# Patient Record
Sex: MALE | Race: WHITE | Employment: FULL TIME | URBAN - METROPOLITAN AREA
[De-identification: names, ages, dates, MRNs, and addresses within clinical notes are randomized per-mention and may not be internally consistent; named-entity substitution may affect disease eponyms.]

---

## 2017-01-25 ENCOUNTER — TELEPHONE (OUTPATIENT)
Dept: FAMILY MEDICINE CLINIC | Age: 42
End: 2017-01-25

## 2017-01-25 ENCOUNTER — OFFICE VISIT (OUTPATIENT)
Dept: FAMILY MEDICINE CLINIC | Age: 42
End: 2017-01-25

## 2017-01-25 VITALS
SYSTOLIC BLOOD PRESSURE: 118 MMHG | TEMPERATURE: 97.8 F | DIASTOLIC BLOOD PRESSURE: 76 MMHG | HEIGHT: 77 IN | HEART RATE: 72 BPM | WEIGHT: 315 LBS | RESPIRATION RATE: 16 BRPM | BODY MASS INDEX: 37.19 KG/M2

## 2017-01-25 DIAGNOSIS — J32.0 CHRONIC MAXILLARY SINUSITIS: Primary | ICD-10-CM

## 2017-01-25 DIAGNOSIS — B34.9 VIRAL SYNDROME: ICD-10-CM

## 2017-01-25 DIAGNOSIS — R51.9 SINUS HEADACHE: ICD-10-CM

## 2017-01-25 PROCEDURE — 99213 OFFICE O/P EST LOW 20 MIN: CPT | Performed by: NURSE PRACTITIONER

## 2017-01-25 RX ORDER — KETOROLAC TROMETHAMINE 10 MG/1
10 TABLET, FILM COATED ORAL EVERY 6 HOURS PRN
Qty: 20 TABLET | Refills: 0 | Status: SHIPPED | OUTPATIENT
Start: 2017-01-25 | End: 2019-01-14 | Stop reason: ALTCHOICE

## 2017-01-25 ASSESSMENT — ENCOUNTER SYMPTOMS
COUGH: 0
SINUS PRESSURE: 1
NAUSEA: 0
ABDOMINAL PAIN: 0
RHINORRHEA: 1
SHORTNESS OF BREATH: 0

## 2018-01-30 ENCOUNTER — OFFICE VISIT (OUTPATIENT)
Dept: FAMILY MEDICINE CLINIC | Age: 43
End: 2018-01-30
Payer: COMMERCIAL

## 2018-01-30 VITALS
RESPIRATION RATE: 20 BRPM | SYSTOLIC BLOOD PRESSURE: 134 MMHG | BODY MASS INDEX: 37.19 KG/M2 | TEMPERATURE: 97.7 F | OXYGEN SATURATION: 95 % | HEART RATE: 88 BPM | DIASTOLIC BLOOD PRESSURE: 82 MMHG | WEIGHT: 315 LBS | HEIGHT: 77 IN

## 2018-01-30 DIAGNOSIS — Z20.828 EXPOSURE TO INFLUENZA: ICD-10-CM

## 2018-01-30 DIAGNOSIS — E66.01 OBESITY, CLASS III, BMI 40-49.9 (MORBID OBESITY) (HCC): ICD-10-CM

## 2018-01-30 DIAGNOSIS — J10.1 INFLUENZA B: Primary | ICD-10-CM

## 2018-01-30 LAB
INFLUENZA A ANTIBODY: NORMAL
INFLUENZA B ANTIBODY: NORMAL

## 2018-01-30 PROCEDURE — G8427 DOCREV CUR MEDS BY ELIG CLIN: HCPCS | Performed by: NURSE PRACTITIONER

## 2018-01-30 PROCEDURE — 99213 OFFICE O/P EST LOW 20 MIN: CPT | Performed by: NURSE PRACTITIONER

## 2018-01-30 PROCEDURE — 87804 INFLUENZA ASSAY W/OPTIC: CPT | Performed by: NURSE PRACTITIONER

## 2018-01-30 PROCEDURE — G8417 CALC BMI ABV UP PARAM F/U: HCPCS | Performed by: NURSE PRACTITIONER

## 2018-01-30 PROCEDURE — 1036F TOBACCO NON-USER: CPT | Performed by: NURSE PRACTITIONER

## 2018-01-30 PROCEDURE — G8482 FLU IMMUNIZE ORDER/ADMIN: HCPCS | Performed by: NURSE PRACTITIONER

## 2018-01-30 RX ORDER — PSEUDOEPHEDRINE HCL 120 MG/1
120 TABLET, FILM COATED, EXTENDED RELEASE ORAL EVERY 12 HOURS
Qty: 14 TABLET | Refills: 0 | Status: SHIPPED | OUTPATIENT
Start: 2018-01-30 | End: 2018-02-06

## 2018-01-30 RX ORDER — OSELTAMIVIR PHOSPHATE 75 MG/1
75 CAPSULE ORAL 2 TIMES DAILY
Qty: 10 CAPSULE | Refills: 0 | Status: SHIPPED | OUTPATIENT
Start: 2018-01-30 | End: 2018-02-04

## 2018-01-30 ASSESSMENT — ENCOUNTER SYMPTOMS
COUGH: 0
SORE THROAT: 1
SINUS PAIN: 1
ABDOMINAL PAIN: 0
NAUSEA: 0
RHINORRHEA: 1
SHORTNESS OF BREATH: 0
SINUS PRESSURE: 1

## 2018-01-30 ASSESSMENT — PATIENT HEALTH QUESTIONNAIRE - PHQ9
1. LITTLE INTEREST OR PLEASURE IN DOING THINGS: 0
2. FEELING DOWN, DEPRESSED OR HOPELESS: 0
SUM OF ALL RESPONSES TO PHQ QUESTIONS 1-9: 0
SUM OF ALL RESPONSES TO PHQ9 QUESTIONS 1 & 2: 0

## 2018-01-30 NOTE — PROGRESS NOTES
Subjective:      Patient ID: Tessie Pinon is a 38 y.o. male. HPI: Acute for URI    Chief Complaint   Patient presents with    Headache     x 2 days    Nasal Congestion    Fatigue    Generalized Body Aches       Onset of 2 days with above symptoms. No fever. Mild cough. Denies CP, SOB or chest tightness. Head congestion is biggest complaint. Sleep difficult due to congestion. Weakness and achiness. Wife was POS for FLU B and was admitted into hosptial    Vitals:    01/30/18 0918   BP: 134/82   Pulse: 88   Resp: 20   Temp: 97.7 °F (36.5 °C)   SpO2: 95%         Review of Systems   Constitutional: Positive for fatigue. Negative for chills and fever. HENT: Positive for congestion, rhinorrhea, sinus pain, sinus pressure and sore throat. Respiratory: Negative for cough and shortness of breath. Cardiovascular: Negative for chest pain. Gastrointestinal: Negative for abdominal pain and nausea. Musculoskeletal: Positive for myalgias. Skin: Negative for rash. Neurological: Positive for headaches. Negative for dizziness and light-headedness. Psychiatric/Behavioral: Negative. Objective:   Physical Exam   Constitutional: He is oriented to person, place, and time. Vital signs are normal. He appears well-developed and well-nourished. He is active. He does not have a sickly appearance. No distress. HENT:   Right Ear: Tympanic membrane normal.   Left Ear: Tympanic membrane normal.   Nose: Mucosal edema and rhinorrhea present. Right sinus exhibits maxillary sinus tenderness. Left sinus exhibits maxillary sinus tenderness. Mouth/Throat: Mucous membranes are normal. Posterior oropharyngeal edema present. No posterior oropharyngeal erythema. Cardiovascular: Normal rate, regular rhythm, S1 normal, S2 normal, normal heart sounds and normal pulses. Exam reveals no S3. No murmur heard. Pulmonary/Chest: Effort normal and breath sounds normal. He has no decreased breath sounds.  He has

## 2018-02-01 ENCOUNTER — TELEPHONE (OUTPATIENT)
Dept: FAMILY MEDICINE CLINIC | Age: 43
End: 2018-02-01

## 2018-02-01 NOTE — TELEPHONE ENCOUNTER
Pt called office stating he was given a work slip to [employer] today. He said his symptoms are still present and he is unable to perform his job. Pt is asking for a work slip to cover him today. He would like it faxed to [employer] attn: Rojelio Saucedo at [phone]. Pt wants a call back when this is taken care of. Please advise.

## 2019-01-14 ENCOUNTER — OFFICE VISIT (OUTPATIENT)
Dept: FAMILY MEDICINE CLINIC | Age: 44
End: 2019-01-14
Payer: COMMERCIAL

## 2019-01-14 VITALS
SYSTOLIC BLOOD PRESSURE: 136 MMHG | WEIGHT: 315 LBS | TEMPERATURE: 98.4 F | HEART RATE: 60 BPM | HEIGHT: 77 IN | BODY MASS INDEX: 37.19 KG/M2 | DIASTOLIC BLOOD PRESSURE: 84 MMHG | RESPIRATION RATE: 16 BRPM

## 2019-01-14 DIAGNOSIS — R68.89 FLU-LIKE SYMPTOMS: Primary | ICD-10-CM

## 2019-01-14 DIAGNOSIS — E66.01 OBESITY, CLASS III, BMI 40-49.9 (MORBID OBESITY) (HCC): ICD-10-CM

## 2019-01-14 PROCEDURE — 1036F TOBACCO NON-USER: CPT | Performed by: NURSE PRACTITIONER

## 2019-01-14 PROCEDURE — 87804 INFLUENZA ASSAY W/OPTIC: CPT | Performed by: NURSE PRACTITIONER

## 2019-01-14 PROCEDURE — G8427 DOCREV CUR MEDS BY ELIG CLIN: HCPCS | Performed by: NURSE PRACTITIONER

## 2019-01-14 PROCEDURE — G8417 CALC BMI ABV UP PARAM F/U: HCPCS | Performed by: NURSE PRACTITIONER

## 2019-01-14 PROCEDURE — 99213 OFFICE O/P EST LOW 20 MIN: CPT | Performed by: NURSE PRACTITIONER

## 2019-01-14 PROCEDURE — G8484 FLU IMMUNIZE NO ADMIN: HCPCS | Performed by: NURSE PRACTITIONER

## 2019-01-14 RX ORDER — PSEUDOEPHEDRINE HCL 120 MG/1
120 TABLET, FILM COATED, EXTENDED RELEASE ORAL EVERY 12 HOURS
Qty: 14 TABLET | Refills: 0 | Status: SHIPPED | OUTPATIENT
Start: 2019-01-14 | End: 2019-01-21

## 2019-01-14 ASSESSMENT — ENCOUNTER SYMPTOMS
SHORTNESS OF BREATH: 0
COUGH: 0
SINUS PRESSURE: 1
RHINORRHEA: 1
ABDOMINAL PAIN: 0
SINUS PAIN: 1
NAUSEA: 0

## 2019-01-15 LAB
INFLUENZA A ANTIBODY: NORMAL
INFLUENZA B ANTIBODY: NORMAL

## 2019-04-17 ENCOUNTER — TELEPHONE (OUTPATIENT)
Dept: FAMILY MEDICINE CLINIC | Age: 44
End: 2019-04-17

## 2019-04-17 ENCOUNTER — OFFICE VISIT (OUTPATIENT)
Dept: FAMILY MEDICINE CLINIC | Age: 44
End: 2019-04-17
Payer: COMMERCIAL

## 2019-04-17 VITALS
RESPIRATION RATE: 20 BRPM | BODY MASS INDEX: 37.19 KG/M2 | SYSTOLIC BLOOD PRESSURE: 136 MMHG | HEIGHT: 77 IN | DIASTOLIC BLOOD PRESSURE: 78 MMHG | HEART RATE: 76 BPM | WEIGHT: 315 LBS | TEMPERATURE: 98.1 F

## 2019-04-17 DIAGNOSIS — B35.1 ONYCHOMYCOSIS: Primary | ICD-10-CM

## 2019-04-17 DIAGNOSIS — E66.01 MORBID OBESITY WITH BMI OF 50.0-59.9, ADULT (HCC): ICD-10-CM

## 2019-04-17 DIAGNOSIS — B37.49 GENITAL CANDIDIASIS IN MALE: ICD-10-CM

## 2019-04-17 PROCEDURE — G8427 DOCREV CUR MEDS BY ELIG CLIN: HCPCS | Performed by: NURSE PRACTITIONER

## 2019-04-17 PROCEDURE — 99213 OFFICE O/P EST LOW 20 MIN: CPT | Performed by: NURSE PRACTITIONER

## 2019-04-17 PROCEDURE — 1036F TOBACCO NON-USER: CPT | Performed by: NURSE PRACTITIONER

## 2019-04-17 PROCEDURE — G8417 CALC BMI ABV UP PARAM F/U: HCPCS | Performed by: NURSE PRACTITIONER

## 2019-04-17 RX ORDER — ACETAMINOPHEN 500 MG
500 TABLET ORAL EVERY 6 HOURS PRN
COMMUNITY
End: 2021-10-26

## 2019-04-17 RX ORDER — NYSTATIN 100000 [USP'U]/G
POWDER TOPICAL 2 TIMES DAILY
Qty: 1 BOTTLE | Refills: 3 | Status: SHIPPED | OUTPATIENT
Start: 2019-04-17 | End: 2020-03-06

## 2019-04-17 RX ORDER — TERBINAFINE HYDROCHLORIDE 250 MG/1
250 TABLET ORAL DAILY
Qty: 90 TABLET | Refills: 0 | Status: SHIPPED | OUTPATIENT
Start: 2019-04-17 | End: 2019-07-16

## 2019-04-17 RX ORDER — CLOTRIMAZOLE 1 %
CREAM (GRAM) TOPICAL
Qty: 120 G | Refills: 0 | Status: SHIPPED | OUTPATIENT
Start: 2019-04-17 | End: 2019-04-24

## 2019-04-17 ASSESSMENT — PATIENT HEALTH QUESTIONNAIRE - PHQ9
SUM OF ALL RESPONSES TO PHQ9 QUESTIONS 1 & 2: 0
SUM OF ALL RESPONSES TO PHQ QUESTIONS 1-9: 0
SUM OF ALL RESPONSES TO PHQ QUESTIONS 1-9: 0
2. FEELING DOWN, DEPRESSED OR HOPELESS: 0
1. LITTLE INTEREST OR PLEASURE IN DOING THINGS: 0

## 2019-04-17 ASSESSMENT — ENCOUNTER SYMPTOMS: ROS SKIN COMMENTS: SEE HPI.

## 2019-04-17 NOTE — TELEPHONE ENCOUNTER
Pt called office stating he had an appt with you today and rx Nystatin powder was prescribed. He was told by [facility] that his insurance will not cover this. Please advise.

## 2019-04-17 NOTE — PROGRESS NOTES
Subjective:      Patient ID: Luis Alberto Swan is a 41 y.o. male. HPI: Acute for male issues    Chief Complaint   Patient presents with   Bret Collier Other     discuss male issues, along with some fungus on his toe nails     Patient comes in with a rash and sore areas that \"feel like they are cut\" for the last year intermittently. He is morbidly obese and has multiple inguinal and groin folds of skin. His rash is started getting bad here within the last week but he is applying cortisone cream as well as some other topical powders which have done no limbus sting and burn. Body mass index is 54.13 kg/m². Left great toenail. Thick and brittle. Discolored. Wonders about treatment    Patient Active Problem List   Diagnosis    Hypertension    Obesity, Class III, BMI 40-49.9 (morbid obesity) ([address]    Kidney stone    Genital candidiasis in male         Review of Systems   Skin: Positive for rash. See HPI. Objective:   Physical Exam   Genitourinary: Testes normal and penis normal. Circumcised. No discharge found. Musculoskeletal:        Feet:        Assessment:       Diagnosis Orders   1. Onychomycosis  terbinafine (LAMISIL) 250 MG tablet   2. Genital candidiasis in male  terbinafine (LAMISIL) 250 MG tablet    nystatin (MYCOSTATIN) 100342 UNIT/GM powder   3.  Morbid obesity with BMI of 50.0-59.9, adult (HCC)             Plan:      Lamisil x 90 days  Cross coverage with genital infection  Nystatin Powder  Gold Bond Powder for maintain - keep area dry and clean  Weight loss discussed  RTO if symptoms worsen or stay the same          Jon Wilson, APRN - CNP

## 2019-04-17 NOTE — PATIENT INSTRUCTIONS
Patient Education        Toenail Fungus: Care Instructions  Your Care Instructions  A toenail that is infected by a fungus usually turns white or yellow. As the fungus spreads, the nail turns a darker color and gets thicker, and its edges start to turn ragged and crumble. A bad infection can cause toe pain, and the nail may pull away from the toe. Toenails that are exposed to moisture and warmth a lot are more likely to get infected by a fungus. This can happen from wearing sweaty shoes often and from walking barefoot on shower floors. It is hard to treat toenail fungus, and the infection can return after it has cleared up. But medicines can sometimes get rid of toenail fungus for good. If the infection is very bad, or if it causes a lot of pain, you may need to have the nail removed. Follow-up care is a key part of your treatment and safety. Be sure to make and go to all appointments, and call your doctor if you are having problems. It's also a good idea to know your test results and keep a list of the medicines you take. How can you care for yourself at home? · Take your medicines exactly as prescribed. Call your doctor if you have any problems with your medicine. You will get more details on the specific medicines your doctor prescribes. · If your doctor gave you a cream or liquid to put on your toenail, use it exactly as directed. · Wash your feet often, and wash your hands after touching your feet. · Keep your toenails clean and dry. Dry your feet completely after you bathe and before you put on shoes and socks. · Keep your toenails trimmed. · Change socks often. Wear dry socks that absorb moisture. · Do not go barefoot in public places. · Use a spray or powder that fights fungus on your feet and in your shoes. · Do not pick at the skin around your nails. · Do not use nail polish or fake nails on your toenails. When should you call for help?   Call your doctor now or seek immediate medical care if:    · You have signs of infection, such as:  ? Increased pain, swelling, warmth, or redness. ? Red streaks leading from the site. ? Pus draining from the site. ? A fever.     · You have new or increased toe pain.    Watch closely for changes in your health, and be sure to contact your doctor if:    · You do not get better as expected. Where can you learn more? Go to [email or web address] and sign in to your [employer] account. Enter D202 in the [employer] box to learn more about \"Toenail Fungus: Care Instructions. \"     If you do not have an account, please click on the \"Sign Up Now\" link. Current as of: April 17, 2018  Content Version: 11.9  © 0600-8685 [employer]. Care instructions adapted under license by [facility] ([facility]). If you have questions about a medical condition or this instruction, always ask your healthcare professional. [place] [address] any warranty or liability for your use of this information.

## 2019-04-17 NOTE — LETTER
[address] [facility] [address] [address]  Phone: [phone]  Fax: [phone]    RICHIE Cervantes CNP        April 17, 2019     Patient: Rebeca Hunter   YOB: 1975   Date of Visit: 4/17/2019       To Whom it May Concern:    Shayne Acevedo was seen in my clinic on 4/17/2019. He may return to work on 4/22/19. If you have any questions or concerns, please don't hesitate to call.     Sincerely,         RICHIE Cervantes CNP

## 2019-04-17 NOTE — PROGRESS NOTES
Visit Information    Have you changed or started any medications since your last visit including any over-the-counter medicines, vitamins, or herbal medicines? no   Are you having any side effects from any of your medications? -  no  Have you stopped taking any of your medications? Is so, why? -  no    Have you seen any other physician or provider since your last visit? No  Have you had any other diagnostic tests since your last visit? No  Have you been seen in the emergency room and/or had an admission to a hospital since we last saw you? No  Have you had your routine dental cleaning in the past 6 months? yes - 3/2019    Have you activated your [employer] account? If not, what are your barriers?  Yes     Patient Care Team:  RICHIE Funez CNP as PCP - General (Nurse Practitioner)  RICHIE Funez CNP as PCP - S Attributed Provider    Medical History Review  Past Medical, Family, and Social History reviewed and does contribute to the patient presenting condition    Health Maintenance   Topic Date Due    HIV screen  10/05/1992    DTaP/Tdap/Td vaccine (1 - Tdap) 03/30/2016    Diabetes screen  10/05/2017    Flu vaccine (Season Ended) 09/01/2019    Lipid screen  08/26/2021    Pneumococcal 0-64 years Vaccine  Aged Out

## 2020-03-06 ENCOUNTER — OFFICE VISIT (OUTPATIENT)
Dept: FAMILY MEDICINE CLINIC | Age: 45
End: 2020-03-06
Payer: COMMERCIAL

## 2020-03-06 VITALS
RESPIRATION RATE: 18 BRPM | HEART RATE: 68 BPM | BODY MASS INDEX: 37.19 KG/M2 | SYSTOLIC BLOOD PRESSURE: 136 MMHG | WEIGHT: 315 LBS | HEIGHT: 77 IN | DIASTOLIC BLOOD PRESSURE: 82 MMHG

## 2020-03-06 PROBLEM — Z99.89 OSA ON CPAP: Status: ACTIVE | Noted: 2020-03-06

## 2020-03-06 PROBLEM — G47.33 OSA ON CPAP: Status: ACTIVE | Noted: 2020-03-06

## 2020-03-06 PROBLEM — E66.01 MORBID OBESITY WITH BMI OF 50.0-59.9, ADULT (HCC): Status: ACTIVE | Noted: 2020-03-06

## 2020-03-06 PROCEDURE — G8484 FLU IMMUNIZE NO ADMIN: HCPCS | Performed by: NURSE PRACTITIONER

## 2020-03-06 PROCEDURE — G8427 DOCREV CUR MEDS BY ELIG CLIN: HCPCS | Performed by: NURSE PRACTITIONER

## 2020-03-06 PROCEDURE — G8417 CALC BMI ABV UP PARAM F/U: HCPCS | Performed by: NURSE PRACTITIONER

## 2020-03-06 PROCEDURE — 99213 OFFICE O/P EST LOW 20 MIN: CPT | Performed by: NURSE PRACTITIONER

## 2020-03-06 PROCEDURE — 1036F TOBACCO NON-USER: CPT | Performed by: NURSE PRACTITIONER

## 2020-03-06 RX ORDER — FLUTICASONE PROPIONATE 50 MCG
2 SPRAY, SUSPENSION (ML) NASAL DAILY
Qty: 3 BOTTLE | Refills: 1 | Status: SHIPPED | OUTPATIENT
Start: 2020-03-06 | End: 2020-06-15 | Stop reason: SDUPTHER

## 2020-03-06 SDOH — ECONOMIC STABILITY: FOOD INSECURITY: WITHIN THE PAST 12 MONTHS, THE FOOD YOU BOUGHT JUST DIDN'T LAST AND YOU DIDN'T HAVE MONEY TO GET MORE.: NEVER TRUE

## 2020-03-06 SDOH — ECONOMIC STABILITY: FOOD INSECURITY: WITHIN THE PAST 12 MONTHS, YOU WORRIED THAT YOUR FOOD WOULD RUN OUT BEFORE YOU GOT MONEY TO BUY MORE.: NEVER TRUE

## 2020-03-06 SDOH — ECONOMIC STABILITY: TRANSPORTATION INSECURITY
IN THE PAST 12 MONTHS, HAS THE LACK OF TRANSPORTATION KEPT YOU FROM MEDICAL APPOINTMENTS OR FROM GETTING MEDICATIONS?: NO

## 2020-03-06 SDOH — ECONOMIC STABILITY: TRANSPORTATION INSECURITY
IN THE PAST 12 MONTHS, HAS LACK OF TRANSPORTATION KEPT YOU FROM MEETINGS, WORK, OR FROM GETTING THINGS NEEDED FOR DAILY LIVING?: NO

## 2020-03-06 SDOH — ECONOMIC STABILITY: INCOME INSECURITY: HOW HARD IS IT FOR YOU TO PAY FOR THE VERY BASICS LIKE FOOD, HOUSING, MEDICAL CARE, AND HEATING?: NOT HARD AT ALL

## 2020-03-06 ASSESSMENT — PATIENT HEALTH QUESTIONNAIRE - PHQ9
SUM OF ALL RESPONSES TO PHQ9 QUESTIONS 1 & 2: 0
SUM OF ALL RESPONSES TO PHQ QUESTIONS 1-9: 0
2. FEELING DOWN, DEPRESSED OR HOPELESS: 0
1. LITTLE INTEREST OR PLEASURE IN DOING THINGS: 0
SUM OF ALL RESPONSES TO PHQ QUESTIONS 1-9: 0

## 2020-03-06 NOTE — PROGRESS NOTES
Visit Information    Have you changed or started any medications since your last visit including any over-the-counter medicines, vitamins, or herbal medicines? no   Are you having any side effects from any of your medications? -  no  Have you stopped taking any of your medications? Is so, why? -  yes - see updated med list    Have you seen any other physician or provider since your last visit? No  Have you had any other diagnostic tests since your last visit? No  Have you been seen in the emergency room and/or had an admission to a hospital since we last saw you? No  Have you had your routine dental cleaning in the past 6 months? n/a    Have you activated your [employer] account? If not, what are your barriers?  Yes     Patient Care Team:  RICHIE Yuen CNP as PCP - General (Nurse Practitioner)  RICHIE Yuen CNP as PCP - [facility]    Medical History Review  Past Medical, Family, and Social History reviewed and does contribute to the patient presenting condition    Health Maintenance   Topic Date Due    DTaP/Tdap/Td vaccine (1 - Tdap) 10/05/1988    HIV screen  10/05/1992    Diabetes screen  10/05/2017    Flu vaccine (1) 09/01/2019    Lipid screen  08/26/2021    Shingles Vaccine (1 of 2) 10/05/2027    Hepatitis A vaccine  Aged Out    Hepatitis B vaccine  Aged Out    Hib vaccine  Aged Out    Meningococcal (ACWY) vaccine  Aged Out    Pneumococcal 0-64 years Vaccine  Aged Out
DTaP/Tdap/Td vaccine (1 - Tdap) 10/05/1988    HIV screen  10/05/1992    Diabetes screen  10/05/2017    Flu vaccine (1) 09/01/2019    Lipid screen  08/26/2021    Shingles Vaccine (1 of 2) 10/05/2027    Hepatitis A vaccine  Aged Out    Hepatitis B vaccine  Aged Out    Hib vaccine  Aged Out    Meningococcal (ACWY) vaccine  Aged Out    Pneumococcal 0-64 years Vaccine  Aged [employer] History   Administered Date(s) Administered    Influenza Virus Vaccine 11/10/2017    Td, unspecified formulation 03/29/2016       Review of Systems    Objective:   Physical Exam    Assessment:       Diagnosis Orders   1. Morbid obesity with BMI of 50.0-59.9, adult (HCC)  CBC    Lipid Panel    TSH with Reflex    Comprehensive Metabolic Panel    Hemoglobin A1C    Mayuri Snell MD, Pulmonology, [place] [address]   2. NATACHA on CPAP  Crispin Barrera MD, Pulmonology, [place] [address]   3.  Chronic pansinusitis  fluticasone (FLONASE) 50 MCG/ACT nasal spray           Plan:      Screening labs  Refer to SLEEP  CT Sinus vs Medication Tx   - Flonase Daily  Healthy Lifestyles discussed - weight loss  RTO PRN        RICHIE Martini - CNP
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
## 2020-04-16 ENCOUNTER — TELEPHONE (OUTPATIENT)
Dept: FAMILY MEDICINE CLINIC | Age: 45
End: 2020-04-16

## 2020-04-16 NOTE — TELEPHONE ENCOUNTER
Pts Cpap machine is not working well. Pt is using it still but he is waking up periodically thru the night. Pt states His Appt with Sleep lab was r/s. But I saw it was still scheduled on his appt desk. So I called pulmonary and they are going to see the pt on the 4-24-20.

## 2020-04-24 ENCOUNTER — INITIAL CONSULT (OUTPATIENT)
Dept: PULMONOLOGY | Age: 45
End: 2020-04-24
Payer: COMMERCIAL

## 2020-04-24 VITALS
DIASTOLIC BLOOD PRESSURE: 82 MMHG | SYSTOLIC BLOOD PRESSURE: 126 MMHG | HEART RATE: 71 BPM | BODY MASS INDEX: 37.19 KG/M2 | TEMPERATURE: 96.7 F | WEIGHT: 315 LBS | OXYGEN SATURATION: 98 % | HEIGHT: 77 IN

## 2020-04-24 PROCEDURE — 99244 OFF/OP CNSLTJ NEW/EST MOD 40: CPT | Performed by: INTERNAL MEDICINE

## 2020-04-24 PROCEDURE — G8417 CALC BMI ABV UP PARAM F/U: HCPCS | Performed by: INTERNAL MEDICINE

## 2020-04-24 PROCEDURE — G8427 DOCREV CUR MEDS BY ELIG CLIN: HCPCS | Performed by: INTERNAL MEDICINE

## 2020-04-24 PROCEDURE — 1036F TOBACCO NON-USER: CPT | Performed by: INTERNAL MEDICINE

## 2020-04-24 NOTE — PATIENT INSTRUCTIONS
Recommendations/Plan:  - Schedule patient for CPAP titration at [facility] THE [facility] sleep lab as soon as possible. Patient to follow with my clinic at [facility] sleep clinic in 6 to 8 weeks with CPAP download for further evaluation.  -Continue Flonase 50mcg/INH 2sprays each nostril daily.   - He want to go for a new CPAP device. His current CPAP machine is old and making loud noise.  -He was advised to continue current positive airway pressure therapy until new pressure settings were available  -He advised to keep good compliance with current recommended pressure to get optimal results and clinical improvement.  -He was advised to call [facility] regarding supplies if needed. -He was advised to practice good sleep hygiene techniques. He was provided with a hand out.  -He was instructed to not to drive any motor vehicles or operate heavy equipment if he feels sleepy. -He was advised call my office for earlier appointment if needed for worsening of sleep symptoms.  -He was advised to loose weight by controlling diet and doing exercise once cleared by his family physician.   -Kamini Bonilla educated about my impression and plan.  Patient verbalizes understanding

## 2020-06-10 ENCOUNTER — TELEPHONE (OUTPATIENT)
Dept: PULMONOLOGY | Age: 45
End: 2020-06-10

## 2020-06-12 ENCOUNTER — TELEPHONE (OUTPATIENT)
Dept: PULMONOLOGY | Age: 45
End: 2020-06-12

## 2020-06-15 ENCOUNTER — E-VISIT (OUTPATIENT)
Dept: FAMILY MEDICINE CLINIC | Age: 45
End: 2020-06-15
Payer: COMMERCIAL

## 2020-06-15 PROCEDURE — 99421 OL DIG E/M SVC 5-10 MIN: CPT | Performed by: NURSE PRACTITIONER

## 2020-06-15 RX ORDER — FLUTICASONE PROPIONATE 50 MCG
2 SPRAY, SUSPENSION (ML) NASAL DAILY
Qty: 3 BOTTLE | Refills: 3 | Status: SHIPPED | OUTPATIENT
Start: 2020-06-15 | End: 2021-06-30

## 2020-06-15 NOTE — PROGRESS NOTES
HPI: see questionnaire  Objective: NA     Assessment/Plan        Diagnosis Orders   1. Chronic pansinusitis  fluticasone (FLONASE) 50 MCG/ACT nasal spray         MDM:  Allergies stable. Refill as above. 5-10 minutes were spent on the digital evaluation and management of this patient.         Encouraged to follow up with your PCP if not better

## 2020-08-01 ENCOUNTER — E-VISIT (OUTPATIENT)
Dept: FAMILY MEDICINE CLINIC | Age: 45
End: 2020-08-01

## 2020-08-03 RX ORDER — TERBINAFINE HYDROCHLORIDE 250 MG/1
250 TABLET ORAL DAILY
Qty: 90 TABLET | Refills: 0 | Status: SHIPPED | OUTPATIENT
Start: 2020-08-03 | End: 2020-11-01

## 2020-08-03 NOTE — PROGRESS NOTES
HPI: see questionnaire  Objective: NA     Assessment/Plan        Diagnosis Orders   1. Onychomycosis  terbinafine (LAMISIL) 250 MG tablet         MDM: Lamisil x 90 days. Recommend see POD for possible treatment as well. Lab Results   Component Value Date    ALT 35 08/26/2016    AST 28 08/26/2016    ALKPHOS 59 08/26/2016    BILITOT 0.6 08/26/2016        11-20 minutes were spent on the digital evaluation and management of this patient.           Encouraged to follow up with your PCP if not better

## 2020-09-08 NOTE — PROGRESS NOTES
[place] for Pulmonary, Sleep and [facility] Follow up note    Erick Sumner          Chief Complaint: Vikki Sigala is here for a 3 month sleep follow up. Chief complaint and [place]: Erick Sumner is a 45 y. o.oldmale came for follow up regarding his sleep apnea. He is currently using his positive airway pressure device with a CPAP pressure of 12cm H20. He denies any problems with machine or mask. He is sleeping well at night with out difficulty. He denies any daytime sleepiness. Review of Systems:   General/Constitutional: he lost 8lbs of weight from the last visit with normal appetite. No fever or chills. HENT: Negative. Eyes: Negative. Upper respiratory tract: No nasal stuffiness or post nasal drip. Lower respiratory tract/ lungs: No cough or sputum production. No hemoptysis. Cardiovascular: No palpitations or chest pain. Gastrointestinal: No nausea or vomiting. Neurological: No focal neurologiacal weakness. Extremities: No edema. Musculoskeletal: No complaints. Genitourinary: No complaints. Hematological: Negative. Psychiatric/Behavioral: Negative. Skin: No itching.         Past Medical History:   Diagnosis Date    Hypertension     Kidney stone        Past Surgical History:   Procedure Laterality Date    CYSTOSCOPY Left 3/14/13    cystoscopy, left ureteroscopy, laser lithotripsy, left stent insertion       Social History     Tobacco Use    Smoking status: Former Smoker     Types: Cigarettes     Last attempt to quit: 3/5/2000     Years since quittin.5    Smokeless tobacco: Former User     Types: Snuff     Quit date: 3/5/2000   Substance Use Topics    Alcohol use: No    Drug use: No       No Known Allergies    Current Outpatient Medications   Medication Sig Dispense Refill    terbinafine (LAMISIL) 250 MG tablet Take 1 tablet by mouth daily 90 tablet 0    fluticasone (FLONASE) 50 MCG/ACT nasal spray 2 sprays by Each Nostril route daily 3 Bottle 3    acetaminophen (TYLENOL) 500 MG tablet Take 500 mg by mouth every 6 hours as needed for Pain      IBUPROFEN PO Take by mouth       No current facility-administered medications for this visit. Family History   Problem Relation Age of Onset    Cancer Mother         thyroid    High Blood Pressure Mother     Diabetes Maternal Grandmother     Heart Disease Maternal Grandfather     Diabetes Paternal Grandmother           /82 (Site: Left Upper Arm, Position: Sitting, Cuff Size: Medium Adult)   Pulse 70   Temp 97.5 °F (36.4 °C)   Ht 6' 5\" (1.956 m)   Wt (!) 456 lb 12.8 oz (207.2 kg)   SpO2 98% Comment: on ra  BMI 54.17 kg/m²     BMI:  Body mass index is 54.17 kg/m². Mallampati airway Class: 3   Neck Circumference: 22.5 Inches   [place] sleepiness score 9/25/20: 8     Physical Exam :  Constitutional: Patient appears moderately built and moderately nourished. No distress. Patient is oriented to person, place, and time. HENT:   Head: Normocephalic and atraumatic. Right Ear: External ear normal.   Left Ear: External ear normal.   Mouth/Throat: Oropharynx is clear and moist.   Eyes: Conjunctivae are normal. Pupils are equal and reactive to light. No scleral icterus. Neck: Neck supple. No JVD present. Cardiovascular: Normal rate, regular rhythm, normal heart sounds. No murmur heard. Pulmonary/Chest: Effort normal and breath sounds normal. No stridor. No respiratory distress. No wheezes. No rales. Abdominal: Soft. Patient exhibits no distension. No tenderness. Musculoskeletal: Normal range of motion. Extremities:Patient exhibits no edema and no tenderness. Lymphadenopathy:  No cervical adenopathy. Neurological: Patient is alert and oriented to person, place, and time. Skin: Skin is warm and dry. Patient is not diaphoretic. Psychiatric: Patient  has a normal mood and affect.     Diagnostic Data:    CPAP titration

## 2020-09-25 ENCOUNTER — OFFICE VISIT (OUTPATIENT)
Dept: PULMONOLOGY | Age: 45
End: 2020-09-25
Payer: COMMERCIAL

## 2020-09-25 VITALS
DIASTOLIC BLOOD PRESSURE: 82 MMHG | HEIGHT: 77 IN | HEART RATE: 70 BPM | TEMPERATURE: 97.5 F | WEIGHT: 315 LBS | BODY MASS INDEX: 37.19 KG/M2 | SYSTOLIC BLOOD PRESSURE: 126 MMHG | OXYGEN SATURATION: 98 %

## 2020-09-25 PROCEDURE — G8417 CALC BMI ABV UP PARAM F/U: HCPCS | Performed by: INTERNAL MEDICINE

## 2020-09-25 PROCEDURE — G8427 DOCREV CUR MEDS BY ELIG CLIN: HCPCS | Performed by: INTERNAL MEDICINE

## 2020-09-25 PROCEDURE — 99213 OFFICE O/P EST LOW 20 MIN: CPT | Performed by: INTERNAL MEDICINE

## 2020-09-25 PROCEDURE — 1036F TOBACCO NON-USER: CPT | Performed by: INTERNAL MEDICINE

## 2020-09-25 NOTE — PROGRESS NOTES
Chief Complaint:  Lugenia Flatness is here for a 3 month sleep follow up    Mallampati airway Class:  3  Neck Circumference:  22.5 Inches    [place] sleepiness score 9/25/20:  8      Diagnostic Data: 12/19/14  AHI 15  PAP Download:    Recorded compliance dates:  8/25/20 -  9/23/20  More than 4hour usage compliance was:  100%.   Average residual Apnea- Hypoapnea index on current pressue was:0.7       PAP Type airsense 10    Level  12 cmh2o       Average usuage hours per day was: 7:39    Interface: nasal     Provider:  [x][facility]  [][employer]  [][facility]  [][facility]         [][facility] []Other:

## 2021-04-27 ENCOUNTER — VIRTUAL VISIT (OUTPATIENT)
Dept: FAMILY MEDICINE CLINIC | Age: 46
End: 2021-04-27
Payer: MEDICARE

## 2021-04-27 DIAGNOSIS — A08.4 VIRAL GASTROENTERITIS: Primary | ICD-10-CM

## 2021-04-27 PROCEDURE — 99213 OFFICE O/P EST LOW 20 MIN: CPT | Performed by: NURSE PRACTITIONER

## 2021-04-27 PROCEDURE — G8428 CUR MEDS NOT DOCUMENT: HCPCS | Performed by: NURSE PRACTITIONER

## 2021-04-27 ASSESSMENT — ENCOUNTER SYMPTOMS
SHORTNESS OF BREATH: 0
COUGH: 0
NAUSEA: 1
ABDOMINAL PAIN: 0
VOMITING: 1
DIARRHEA: 1

## 2021-04-27 NOTE — LETTER
[address] [address]  Phone: [phone]  Fax: [phone], APRN - CNP        April 27, 2021     Patient: Shravan Schroeder   YOB: 1975   Date of Visit: 4/27/2021       To Whom it May Concern:    Leni Moore was seen in my clinic on 4/27/2021 for non-covid illness. Excuse from work starting 4/26/21. He may return to work on 4/28/21. If you have any questions or concerns, please don't hesitate to call.     Sincerely,         RICHIE Fiore CNP

## 2021-04-27 NOTE — PROGRESS NOTES
Subjective:      Patient ID: Negro Campoverde is a 39 y.o. male    HPI: Acute for flu-like    TELEHEALTH EVALUATION -- Audio/Visual (During OHKVN-59 public health emergency)    Patient identification was verified at the start of the visit: Yes    Services were provided through a video synchronous discussion virtually to substitute for in-person clinic visit. Patient and provider were located at their individual homes. Patient has requested an audio/video evaluation for the following concern(s):    Chief Complaint   Patient presents with    Influenza       Has been off work yesterday and today. Onset Saturday with symptoms of N/V/D. Improvement noted. Continues complains of HA, diarrhea. Feeling better yesterday and today. Appetite improving    Whole family has been sick with similar GI symptoms. Patient Active Problem List   Diagnosis    Morbid obesity with BMI of 50.0-59.9, adult ([address]    NATACHA on CPAP       Review of Systems   Constitutional: Positive for fatigue. Negative for chills and fever. HENT: Negative. Respiratory: Negative for cough and shortness of breath. Cardiovascular: Negative for chest pain. Gastrointestinal: Positive for diarrhea, nausea and vomiting. Negative for abdominal pain. Skin: Negative for rash. Neurological: Positive for headaches. Negative for dizziness and light-headedness. Psychiatric/Behavioral: Negative. Objective:   Physical Exam  Constitutional:       General: He is not in acute distress. Appearance: He is not ill-appearing. Pulmonary:      Effort: Pulmonary effort is normal. No respiratory distress. Neurological:      Mental Status: He is alert and oriented to person, place, and time. Psychiatric:         Mood and Affect: Mood normal.         Behavior: Behavior normal.         Assessment:       Diagnosis Orders   1.  Viral gastroenteritis         Plan:     Viral nature of symptoms discussed  Symptomatic Care  Increase fluids and rest RTW 4/28/21  RTO if symptoms worsen or stay the same           Due to this being a TeleHealth encounter (During KWBPG-13 public health emergency), evaluation of the following organ systems was limited: Vitals/Constitutional/EENT/Resp/CV/GI//MS/Neuro/Skin/Heme-Lymph-Imm. Pursuant to the emergency declaration under the [address] [place] [address] and the [employer] and [employer] Act, this Virtual Visit was conducted with patient's (and/or legal guardian's) consent, to reduce the patient's risk of exposure to COVID-19 and provide necessary medical care. The patient (and/or legal guardian) has also been advised to contact this office for worsening conditions or problems, and seek emergency medical treatment and/or call 911 if deemed necessary. --RICHIE Maddox - CNP on 4/27/2021 at 8:21 AM    An electronic signature was used to authenticate this note.      4/27/2021

## 2021-06-30 DIAGNOSIS — J32.4 CHRONIC PANSINUSITIS: ICD-10-CM

## 2021-06-30 RX ORDER — FLUTICASONE PROPIONATE 50 MCG
SPRAY, SUSPENSION (ML) NASAL
Qty: 48 G | Refills: 3 | Status: SHIPPED | OUTPATIENT
Start: 2021-06-30 | End: 2022-07-05

## 2021-09-13 ENCOUNTER — VIRTUAL VISIT (OUTPATIENT)
Dept: FAMILY MEDICINE CLINIC | Age: 46
End: 2021-09-13
Payer: MEDICARE

## 2021-09-13 DIAGNOSIS — J01.00 ACUTE NON-RECURRENT MAXILLARY SINUSITIS: Primary | ICD-10-CM

## 2021-09-13 PROCEDURE — G8428 CUR MEDS NOT DOCUMENT: HCPCS | Performed by: NURSE PRACTITIONER

## 2021-09-13 PROCEDURE — 99213 OFFICE O/P EST LOW 20 MIN: CPT | Performed by: NURSE PRACTITIONER

## 2021-09-13 RX ORDER — AMOXICILLIN AND CLAVULANATE POTASSIUM 875; 125 MG/1; MG/1
1 TABLET, FILM COATED ORAL 2 TIMES DAILY WITH MEALS
Qty: 20 TABLET | Refills: 0 | Status: SHIPPED | OUTPATIENT
Start: 2021-09-13 | End: 2021-09-23

## 2021-09-13 ASSESSMENT — ENCOUNTER SYMPTOMS
COUGH: 0
SINUS PAIN: 1
NAUSEA: 0
CHEST TIGHTNESS: 0
RHINORRHEA: 1
SORE THROAT: 1
ABDOMINAL PAIN: 0
SINUS PRESSURE: 1
SHORTNESS OF BREATH: 0

## 2021-09-13 NOTE — LETTER
[address] [address]  Phone: [phone]  Fax: [phone]    RICHIE Baca CNP        September 13, 2021     Patient: Tameka Ferguson   YOB: 1975   Date of Visit: 9/13/2021       To Whom it May Concern:    Venus Leblanc was seen in my clinic on 9/13/2021 for a sinus infection. He may return to work on 9/14/21. If you have any questions or concerns, please don't hesitate to call.     Sincerely,           RICHIE Baca CNP

## 2021-09-13 NOTE — PROGRESS NOTES
Subjective:      Patient ID: Marlene Dominguez is a 39 y.o. male    HPI: Acute for sinus    TELEHEALTH EVALUATION -- Audio/Visual (During QYAWT-71 public health emergency)    Patient identification was verified at the start of the visit: Yes    Services were provided through a video synchronous discussion virtually to substitute for in-person clinic visit. Patient and provider were located at their individual homes. Patient has requested an audio/video evaluation for the following concern(s):    Chief Complaint   Patient presents with    Sinusitis       Onset of 3-4 days with sinus congestion, pressure, ST and ear pressure. Ears muffled. HA. Fatigue due to poor sleep. Hx of sinus issues. Denies cough and chest congestion. Denies fever. No body aches. No COVID exposures. Patient Active Problem List   Diagnosis    Morbid obesity with BMI of 50.0-59.9, adult ([address]    NATACHA on CPAP       Review of Systems   Constitutional: Positive for fatigue. Negative for chills and fever. HENT: Positive for congestion, nosebleeds, postnasal drip, rhinorrhea, sinus pressure, sinus pain and sore throat. Respiratory: Negative for cough, chest tightness and shortness of breath. Cardiovascular: Negative for chest pain. Gastrointestinal: Negative for abdominal pain and nausea. Musculoskeletal: Negative for myalgias. Skin: Negative for rash. Neurological: Positive for headaches. Negative for dizziness and light-headedness. Psychiatric/Behavioral: Negative. Objective:   Physical Exam  Constitutional:       General: He is not in acute distress. Appearance: He is not ill-appearing. Pulmonary:      Effort: Pulmonary effort is normal. No respiratory distress. Neurological:      Mental Status: He is alert and oriented to person, place, and time. Psychiatric:         Mood and Affect: Mood normal.         Behavior: Behavior normal.         Assessment:       Diagnosis Orders   1.  Acute non-recurrent maxillary sinusitis  amoxicillin-clavulanate (AUGMENTIN) 875-125 MG per tablet       Plan:     Orders as above   Fluids and rest  OTC Sudafed  RTW 9/14/21  RTO if symptoms worsen or stay the same               Due to this being a TeleHealth encounter (During OVGRG-71 public health emergency), evaluation of the following organ systems was limited: Vitals/Constitutional/EENT/Resp/CV/GI//MS/Neuro/Skin/Heme-Lymph-Imm. Pursuant to the emergency declaration under the [address] [place] [address], [address] [place] [address] authority and the [employer] and [employer] Act, this Virtual Visit was conducted with patient's (and/or legal guardian's) consent, to reduce the patient's risk of exposure to COVID-19 and provide necessary medical care. The patient (and/or legal guardian) has also been advised to contact this office for worsening conditions or problems, and seek emergency medical treatment and/or call 911 if deemed necessary. --RICHIE Rivera CNP on 9/13/2021 at 11:41 AM    An electronic signature was used to authenticate this note.      9/13/2021

## 2021-09-27 ENCOUNTER — PATIENT MESSAGE (OUTPATIENT)
Dept: FAMILY MEDICINE CLINIC | Age: 46
End: 2021-09-27

## 2021-09-27 DIAGNOSIS — B35.6 TINEA CRURIS: Primary | ICD-10-CM

## 2021-09-27 RX ORDER — TERBINAFINE HYDROCHLORIDE 250 MG/1
250 TABLET ORAL DAILY
Qty: 14 TABLET | Refills: 0 | Status: SHIPPED | OUTPATIENT
Start: 2021-09-27 | End: 2021-10-11

## 2021-09-27 NOTE — TELEPHONE ENCOUNTER
From: Leandro Liz  To: Josephmita King APRN - CNP  Sent: 9/27/2021 10:33 AM EDT  Subject: Prescription Question    I need to see if I can get a script for a yeast infection. I have been taking clortimazole. This is over the counter stuff. . I don't know if I need a dr. prescribed kind. I also need to see if I can get prescribed the powder that helps with this issue too. I have come to see you before for this problem. It doesn't happen all the time. Maybe once a year and this time it's rough.

## 2021-09-30 NOTE — PROGRESS NOTES
[facility] [address] Follow up note    Brandy Tsang             Chief Complaint: Amadou Baptiste is here for a 1 year sleep follow up with download                                    Chief complaint and [place]: Brandy Tsang is a 39 y. o.oldmale came for follow up regarding his sleep apnea. He is currently using his positive airway pressure device with a CPAP pressure of 12cm H20. He denies any problems with machine or mask. He is sleeping well at night with out difficulty. He denies any daytime sleepiness. He is currently working at [employer]. He is working as a . He works during daytime      Review of Systems:   General/Constitutional: he lost 8lbs of weight from the last visit with normal appetite. No fever or chills. HENT: Negative. Eyes: Negative. Upper respiratory tract: Frequent nasal stuffiness with post nasal drip. He uses Flonase nasal spray 2 sprays each nostril daily. However, he would like to go for a different type of intranasal spray. He did not want to continue Flonase  Lower respiratory tract/ lungs: No cough or sputum production. No hemoptysis. Cardiovascular: No palpitations or chest pain. Gastrointestinal: No nausea or vomiting. Neurological: No focal neurologiacal weakness. Extremities: No edema. Musculoskeletal: No complaints. Genitourinary: No complaints. Hematological: Negative. Psychiatric/Behavioral: Negative. Skin: No itching.         Past Medical History:   Diagnosis Date    Hypertension     Kidney stone        Past Surgical History:   Procedure Laterality Date    CYSTOSCOPY Left 3/14/13    cystoscopy, left ureteroscopy, laser lithotripsy, left stent insertion       Social History     Tobacco Use    Smoking status: Former Smoker     Types: Cigarettes     Quit date: 3/5/2000     Years since quittin.5    Smokeless tobacco: Former User     Types: Snuff     Quit date: 3/5/2000 Substance Use Topics    Alcohol use: No    Drug use: No       No Known Allergies    Current Outpatient Medications   Medication Sig Dispense Refill    terbinafine (LAMISIL) 250 MG tablet Take 1 tablet by mouth daily for 14 days 14 tablet 0    fluticasone (FLONASE) 50 MCG/ACT nasal spray instill 2 sprays into each nostril once daily 48 g 3    IBUPROFEN PO Take by mouth      acetaminophen (TYLENOL) 500 MG tablet Take 500 mg by mouth every 6 hours as needed for Pain       No current facility-administered medications for this visit. Family History   Problem Relation Age of Onset    Cancer Mother         thyroid    High Blood Pressure Mother     Diabetes Maternal Grandmother     Heart Disease Maternal Grandfather     Diabetes Paternal Grandmother           /82 (Site: Right Lower Arm, Position: Sitting, Cuff Size: Medium Adult)   Pulse 76   Temp 97 °F (36.1 °C)   Ht 6' 5\" (1.956 m)   Wt (!) 441 lb 9.6 oz (200.3 kg)   SpO2 98% Comment: room air at rest  BMI 52.37 kg/m²     BMI:  Body mass index is 52.37 kg/m². Mallampati airway Class: 4  Neck Circumference: 21.5 inches  [place] sleepiness score 10/1/21: 9  Sleep Apnea Quality of Life Questionnaire: 80       Physical Exam :  Nursing note and vitals reviewed. Constitutional: Patient appears well built and well nourished. No distress. Patient is oriented to person, place, and time. HENT:   Head: Normocephalic and atraumatic. Right Ear: External ear normal.   Left Ear: External ear normal.   Mouth/Throat: Oropharynx is clear and moist.  No oral thrush. Eyes: Conjunctivae are normal. Pupils are equal, round, and reactive to light. No scleral icterus. Neck: Neck supple. No JVD present. No tracheal deviation present. Cardiovascular: Normal rate, regular rhythm, normal heart sounds. No murmur heard. Pulmonary/Chest: Effort normal and breath sounds normal. No stridor. No respiratory distress. No wheezes. No rales.  Patient exhibits no tenderness. Abdominal: Soft. Patient exhibits no distension. No tenderness. Musculoskeletal: Normal range of motion. Extremities: Patient exhibits no edema and no tenderness. Lymphadenopathy:  No cervical adenopathy. Neurological: Patient is alert and oriented to person, place, and time. Skin: Skin is warm and dry. Patient is not diaphoretic. Psychiatric: Patient  has a normal mood and affect. Patient behavior is normal.       Diagnostic Data:    CPAP titration study: Performed on 27 May 2020                  Diagnostic Data: 12/19/14  AHI 15  PAP Download:   Recorded compliance dates: 08/30/21-09/28/21  More than 4hour usage compliance was: 100%  Average residual Apnea- Hypoapnea index on current pressue was: 0.7       PAP Type CPAP   Level  12      Average usuage hours per day was: 7 hours 49 minutes     Interface: nose     Provider:  [x]? -[facility]             []?[facility]                        []?[facility]          []? [facility]                           []?[facility]      []? Other:     95th percentile leak : 35.7L/min- Improving from 54.7L/min of last year. Assesment:  -Moderately severe obstructive sleep apnea on treatment with a CPAP pressure of 12cm H20 - he is using his CPAP device with excellent compliance to >4hours of therapy. His respiratory events were under good control with current therapy. His clinical symptoms improved. He is benefiting from current CPAP therapy and would like to continue the therapy.  -Hx of Hypersomnia ( Excessive daytime sleepiness) due to obstructive sleep apnea-improved  -Allergic rhinitis on treatment with Flonase. Under control. However, he would like to go for a different type of intranasal spray. He did not want to continue Flonase  -Hx of hypertension in the past. He is currently on diet. Under control.     Recommendations/Plan:  - Astelin 0.1% solution ( 137mcg/ spray) 2 sprays each nostril bid   -He was advised to continue Flonase 50 mcg 2 sprays in each nostril daily as needed. -He was educated and advised to apply his current mask properly and tight enough to minimize leaks.  -He will be referred to [facility]( [facility] [employer]) [employer] for mask refit with a different style mask for better compliance,comfort and to minimize leaks.  -Patient will be given a prescription for chin strap to use with his current mask to avoid leaks and to improve his dryness of mouth.  -He was advised to continue current positive airway pressure therapy with above described pressure.  -He was advised to keep good compliance with current recommended pressure to get optimal results and clinical improvement.  -He was advised to call [employer] regarding supplies if needed. -He was advised to call my office for earlier appointment if needed for worsening of sleep symptoms.  -He was advised to continue to practice good sleep hygiene practices.  -Follow with my clinic in 12months for clinical reevaluation with review of download. -He was advised to loose weight by controlling diet and doing exercise once cleared by his family physician.   -Martine Jaimes was educated about my impression and plan. Patient verbalizes understanding.    -I personally reviewed and updated the Past medical hx, Past surgical hx,Social hx, Family hx, Medications, Allergies in the discrete data section of the patient chart. I also reviewed pertaining labs and Pulmonary medicine,Sleep medicine related, Pathological, Microbiological and Radiological investigations.

## 2021-10-01 ENCOUNTER — OFFICE VISIT (OUTPATIENT)
Dept: PULMONOLOGY | Age: 46
End: 2021-10-01
Payer: MEDICARE

## 2021-10-01 VITALS
OXYGEN SATURATION: 98 % | TEMPERATURE: 97 F | SYSTOLIC BLOOD PRESSURE: 124 MMHG | BODY MASS INDEX: 37.19 KG/M2 | HEART RATE: 76 BPM | WEIGHT: 315 LBS | DIASTOLIC BLOOD PRESSURE: 82 MMHG | HEIGHT: 77 IN

## 2021-10-01 DIAGNOSIS — G47.33 OSA ON CPAP: ICD-10-CM

## 2021-10-01 DIAGNOSIS — J30.9 ALLERGIC RHINITIS, UNSPECIFIED SEASONALITY, UNSPECIFIED TRIGGER: Primary | ICD-10-CM

## 2021-10-01 DIAGNOSIS — Z99.89 OSA ON CPAP: ICD-10-CM

## 2021-10-01 PROCEDURE — 1036F TOBACCO NON-USER: CPT | Performed by: INTERNAL MEDICINE

## 2021-10-01 PROCEDURE — 99214 OFFICE O/P EST MOD 30 MIN: CPT | Performed by: INTERNAL MEDICINE

## 2021-10-01 PROCEDURE — G8484 FLU IMMUNIZE NO ADMIN: HCPCS | Performed by: INTERNAL MEDICINE

## 2021-10-01 PROCEDURE — G8417 CALC BMI ABV UP PARAM F/U: HCPCS | Performed by: INTERNAL MEDICINE

## 2021-10-01 PROCEDURE — G8427 DOCREV CUR MEDS BY ELIG CLIN: HCPCS | Performed by: INTERNAL MEDICINE

## 2021-10-01 RX ORDER — AZELASTINE 1 MG/ML
1 SPRAY, METERED NASAL 2 TIMES DAILY
Qty: 30 ML | Refills: 11 | Status: SHIPPED | OUTPATIENT
Start: 2021-10-01 | End: 2021-11-19

## 2021-10-01 NOTE — PATIENT INSTRUCTIONS
Recommendations/Plan:  - Astelin 0.1% solution ( 137mcg/ spray) 2 sprays each nostril bid   -He was advised to continue Flonase 50 mcg 2 sprays in each nostril daily as needed. -He was educated and advised to apply his current mask properly and tight enough to minimize leaks.  -He will be referred to [facility]( [facility] [employer]) [facility] for mask refit with a different style mask for better compliance,comfort and to minimize leaks.  -Patient will be given a prescription for chin strap to use with his current mask to avoid leaks and to improve his dryness of mouth.  -He was advised to continue current positive airway pressure therapy with above described pressure.  -He was advised to keep good compliance with current recommended pressure to get optimal results and clinical improvement.  -He was advised to call [employer] regarding supplies if needed. -He was advised to call my office for earlier appointment if needed for worsening of sleep symptoms.  -He was advised to continue to practice good sleep hygiene practices.  -Follow with my clinic in 12months for clinical reevaluation with review of download. -He was advised to loose weight by controlling diet and doing exercise once cleared by his family physician.   -Wayne Paul was educated about my impression and plan. Patient verbalizes understanding.

## 2021-10-06 ENCOUNTER — PATIENT MESSAGE (OUTPATIENT)
Dept: FAMILY MEDICINE CLINIC | Age: 46
End: 2021-10-06

## 2021-10-06 DIAGNOSIS — Z99.89 OSA ON CPAP: ICD-10-CM

## 2021-10-06 DIAGNOSIS — E66.01 MORBID OBESITY WITH BMI OF 50.0-59.9, ADULT (HCC): ICD-10-CM

## 2021-10-06 DIAGNOSIS — Z00.00 WELL ADULT EXAM: Primary | ICD-10-CM

## 2021-10-06 DIAGNOSIS — G47.33 OSA ON CPAP: ICD-10-CM

## 2021-10-06 NOTE — TELEPHONE ENCOUNTER
From: Martine Jaimes  To: Lisset Nichols, APRN - CNP  Sent: 10/6/2021 2:37 PM EDT  Subject: Non-Urgent Medical Question    I'm considering gastric bypass. I want to loose weight and I have a little bit. But it is difficult now that I'm older. Is this procedure advisable or is there a better option.

## 2021-10-26 ENCOUNTER — VIRTUAL VISIT (OUTPATIENT)
Dept: FAMILY MEDICINE CLINIC | Age: 46
End: 2021-10-26
Payer: MEDICARE

## 2021-10-26 DIAGNOSIS — J06.9 VIRAL URI: Primary | ICD-10-CM

## 2021-10-26 PROCEDURE — G8428 CUR MEDS NOT DOCUMENT: HCPCS | Performed by: NURSE PRACTITIONER

## 2021-10-26 PROCEDURE — 99213 OFFICE O/P EST LOW 20 MIN: CPT | Performed by: NURSE PRACTITIONER

## 2021-10-26 ASSESSMENT — ENCOUNTER SYMPTOMS
SHORTNESS OF BREATH: 0
ABDOMINAL PAIN: 0
RHINORRHEA: 1
COUGH: 1
NAUSEA: 0

## 2021-10-26 NOTE — PROGRESS NOTES
Subjective:      Patient ID: Jose R President is a 42 y.o. male    HPI: Acute for [facility]    TELEHEALTH EVALUATION -- Audio/Visual (During XDDDP-71 public health emergency)    Patient identification was verified at the start of the visit: Yes    Services were provided through a video synchronous discussion virtually to substitute for in-person clinic visit. Patient and provider were located at their individual homes. Patient has requested an audio/video evaluation for the following concern(s):    Chief Complaint   Patient presents with    Cough       Acute onset of body aches, HA, weakness and fatigue yesterday. Slept the day away. Feels he might have had reaction to medication that made symptoms worse    Woke up today feeling much better. Continues with runny nose and cough. Denies fatigue or body aches. No fever or chills. No loss of taste or smell. No COVID exposures    Left work early. Went back to work today    Patient Active Problem List   Diagnosis    Morbid obesity with BMI of 50.0-59.9, adult ([facility] [address]    NATACHA on CPAP       Review of Systems   Constitutional: Negative for chills, fatigue and fever. HENT: Positive for congestion, postnasal drip and rhinorrhea. Respiratory: Positive for cough. Negative for shortness of breath. Cardiovascular: Negative for chest pain. Gastrointestinal: Negative for abdominal pain and nausea. Skin: Negative for rash. Neurological: Negative for dizziness, light-headedness and headaches. Psychiatric/Behavioral: Negative. Objective:   Physical Exam  Constitutional:       General: He is not in acute distress. Appearance: He is not ill-appearing. Pulmonary:      Effort: Pulmonary effort is normal. No respiratory distress. Neurological:      Mental Status: He is alert and oriented to person, place, and time. Psychiatric:         Mood and Affect: Mood normal.         Behavior: Behavior normal.         Assessment:       Diagnosis Orders   1. Viral URI         Plan:     Clinically improved from acute onset yesterday  Viral nature of symptoms discussed  Symptomatic Care  Increase fluids and rest  RTW 10/26/21  RTO if symptoms worsen or stay the same           Due to this being a TeleHealth encounter (During UWXET-11 public health emergency), evaluation of the following organ systems was limited: Vitals/Constitutional/EENT/Resp/CV/GI//MS/Neuro/Skin/Heme-Lymph-Imm. Pursuant to the emergency declaration under the [address] [place] [address], [address] [place] [address] authority and the [employer] and [employer] Act, this Virtual Visit was conducted with patient's (and/or legal guardian's) consent, to reduce the patient's risk of exposure to COVID-19 and provide necessary medical care. The patient (and/or legal guardian) has also been advised to contact this office for worsening conditions or problems, and seek emergency medical treatment and/or call 911 if deemed necessary. --RICHIE Harper - CNP on 10/26/2021 at 10:25 AM    An electronic signature was used to authenticate this note.      10/26/2021

## 2021-10-26 NOTE — LETTER
[address] [address]  Phone: [phone]  Fax: [phone] [facility], APRN - CNP        October 26, 2021     Patient: Herb Quezada   YOB: 1975   Date of Visit: 10/26/2021       To Whom it May Concern:    Brian White was seen in my clinic on 10/26/2021. Excuse from work 10/25/21 due to illness. He may return to work on 10/26/21. If you have any questions or concerns, please don't hesitate to call.     Sincerely,         Huel Cockayne, APRN - CNP

## 2021-11-09 ENCOUNTER — VIRTUAL VISIT (OUTPATIENT)
Dept: FAMILY MEDICINE CLINIC | Age: 46
End: 2021-11-09
Payer: MEDICARE

## 2021-11-09 DIAGNOSIS — F41.1 GAD (GENERALIZED ANXIETY DISORDER): Primary | ICD-10-CM

## 2021-11-09 PROCEDURE — G8427 DOCREV CUR MEDS BY ELIG CLIN: HCPCS | Performed by: NURSE PRACTITIONER

## 2021-11-09 PROCEDURE — 99213 OFFICE O/P EST LOW 20 MIN: CPT | Performed by: NURSE PRACTITIONER

## 2021-11-09 RX ORDER — ESCITALOPRAM OXALATE 10 MG/1
10 TABLET ORAL DAILY
Qty: 30 TABLET | Refills: 1 | Status: SHIPPED | OUTPATIENT
Start: 2021-11-09 | End: 2021-12-23 | Stop reason: SDUPTHER

## 2021-11-09 ASSESSMENT — ENCOUNTER SYMPTOMS
COUGH: 0
SORE THROAT: 0
SHORTNESS OF BREATH: 0

## 2021-11-09 NOTE — PROGRESS NOTES
supplements   RTO in 1 month      An electronic signature was used to authenticate this note.     --Allyson Watson, IRCHIE - CNP

## 2021-11-19 ENCOUNTER — OFFICE VISIT (OUTPATIENT)
Dept: BARIATRICS/WEIGHT MGMT | Age: 46
End: 2021-11-19
Payer: MEDICARE

## 2021-11-19 VITALS
SYSTOLIC BLOOD PRESSURE: 136 MMHG | RESPIRATION RATE: 18 BRPM | HEIGHT: 76 IN | TEMPERATURE: 97.9 F | WEIGHT: 315 LBS | DIASTOLIC BLOOD PRESSURE: 82 MMHG | BODY MASS INDEX: 38.36 KG/M2 | HEART RATE: 88 BPM

## 2021-11-19 DIAGNOSIS — N20.0 NEPHROLITHIASIS: ICD-10-CM

## 2021-11-19 DIAGNOSIS — F32.A DEPRESSION, UNSPECIFIED DEPRESSION TYPE: ICD-10-CM

## 2021-11-19 DIAGNOSIS — F41.9 ANXIETY: ICD-10-CM

## 2021-11-19 DIAGNOSIS — E66.01 MORBID OBESITY (HCC): Primary | ICD-10-CM

## 2021-11-19 DIAGNOSIS — I10 HYPERTENSION, UNSPECIFIED TYPE: ICD-10-CM

## 2021-11-19 PROCEDURE — G8427 DOCREV CUR MEDS BY ELIG CLIN: HCPCS | Performed by: SURGERY

## 2021-11-19 PROCEDURE — G8484 FLU IMMUNIZE NO ADMIN: HCPCS | Performed by: SURGERY

## 2021-11-19 PROCEDURE — G8417 CALC BMI ABV UP PARAM F/U: HCPCS | Performed by: SURGERY

## 2021-11-19 PROCEDURE — 1036F TOBACCO NON-USER: CPT | Performed by: SURGERY

## 2021-11-19 PROCEDURE — 99203 OFFICE O/P NEW LOW 30 MIN: CPT | Performed by: SURGERY

## 2021-11-20 ASSESSMENT — ENCOUNTER SYMPTOMS
APNEA: 1
EYE ITCHING: 0
STRIDOR: 0
PHOTOPHOBIA: 0
BACK PAIN: 1
EYE PAIN: 0
ALLERGIC/IMMUNOLOGIC NEGATIVE: 1
SHORTNESS OF BREATH: 0
ABDOMINAL DISTENTION: 0
DIARRHEA: 1
WHEEZING: 0
SINUS PRESSURE: 0
VOMITING: 0
EYE REDNESS: 0
SORE THROAT: 0
ABDOMINAL PAIN: 0
ANAL BLEEDING: 0
COUGH: 0
VOICE CHANGE: 0
CHOKING: 0
CHEST TIGHTNESS: 0
CONSTIPATION: 0
COLOR CHANGE: 0
EYE DISCHARGE: 0
TROUBLE SWALLOWING: 0
NAUSEA: 0
RECTAL PAIN: 0
FACIAL SWELLING: 0
RHINORRHEA: 0
BLOOD IN STOOL: 0

## 2021-11-20 NOTE — PROGRESS NOTES
Herb Quezada (:  1975)     ASSESSMENT:  1. Morbid obesity (BMI 53)  2. Anxiety  3. Depression  4. Nephrolithiasis  5. Hypertension    PLAN:  1. Long discussion about the pros and cons of weight loss surgery. The risks benefits and alternatives to laparoscopic adjustable band, gastric sleeve and gastric Camacho-en-Y bypass were discussed in detail. The pros and cons of robotic assisted, laparoscopic and open techniques were discussed. 2.  Behavior modification discussed in detail in regards to dietary habits. 3.  Nutritional education occurred during visit. Will set up a consultation/evaluation with dietitian for further evaluation. 4.  Options for medical management of morbid obesity discussed. 5.  Improvement in fitness/exercise discussed with patient and the need for this with/without surgery. 6.  Obtain medical necessity letter from PCP as needed. 7.  Follow-up in one month at weight management program at [address] [place] [address]. 8.  Signs and symptoms reviewed with patient that would be concerning and need him to return to office for re-evaluation. Patient states he will call if he has questions or concerns. 9. Multivitamin  10. Psychology evaluation  11. EGD prior to any surgical intervention  12. Encouraged support groups  13. Encouraged Naturally Slim/Rev It Up  14. Discussed the pros and cons along with possible side effects/complications of weight loss medications. All questions answered. Patient states that if he is not able to lose enough adequate excess body weight with medical management only then he would be like to proceed with surgical intervention such as sleeve gastrectomy/gastric bypass for further weight loss. More than 40 minutes spent with patient today. Greater than 50% of the time was involved counseling, educaton and coordinating care.     SUBJECTIVE/OBJECTIVE:    Chief Complaint   Patient presents with    Bariatric, Initial Visit     New Patient 6 month req      HPI  Eli López is a 57-year-old male presents for initial evaluation at the weight management program secondary to his morbid obesity. BMI 53. Current weight 441 pounds. He states he has always been a big anabel but has gradually been increasing weight over the last several years. Bang score 6. Denies tobacco abuse. Has 6 children. He admits this does take up a lot of his time so it is difficult to exercise. He has tried several times at weight loss by improving his nutrition but he has not been able to keep off the weight long-term. He has been trying to increase physical activity/exercise but states between work and 6 children he has realized he does not have a lot of time to do that. Lower extremity joint aching. Admits that at times he is not able to do things he would like to do physically because of the excess body weight. Also acknowledges that it affects her mentally/socially. Has more fatigue because of the excess body weight. Denies any current chest or abdominal pain. Shortness of breath with increased activity because of excess body weight. No hematochezia or melena. No new urinary complaints. History of nephrolithiasis. He admits that if he is not able to lose and of adequate excess body weight with medical management only then he would like to proceed with surgical intervention such as a sleeve gastrectomy. Review of Systems   Constitutional: Positive for appetite change and fatigue. Negative for activity change, chills, diaphoresis, fever and unexpected weight change. HENT: Negative for congestion, dental problem, drooling, ear discharge, ear pain, facial swelling, hearing loss, mouth sores, nosebleeds, postnasal drip, rhinorrhea, sinus pressure, sneezing, sore throat, tinnitus, trouble swallowing and voice change. Eyes: Negative for photophobia, pain, discharge, redness, itching and visual disturbance. Respiratory: Positive for apnea.  Negative for cough, choking, chest tightness, MCG/ACT nasal spray instill 2 sprays into each nostril once daily 48 g 3     No current facility-administered medications for this visit. No Known Allergies    Family History   Problem Relation Age of Onset   Conley Cancer Mother         thyroid    High Blood Pressure Mother     Diabetes Maternal Grandmother     Obesity Maternal Grandmother     Heart Disease Maternal Grandfather     Diabetes Paternal Grandmother     Obesity Paternal Grandmother     Heart Disease Paternal Grandfather        Social History     Socioeconomic History    Marital status:      Spouse name: Montse Ayala Number of children: 11    Years of education: 15    Highest education level: High school graduate   Occupational History    Not on file   Tobacco Use    Smoking status: Former Smoker     Types: Cigarettes     Quit date: 3/5/2000     Years since quittin.7    Smokeless tobacco: Former User     Types: Snuff     Quit date: 3/5/2000   Vaping Use    Vaping Use: Never used   Substance and Sexual Activity    Alcohol use: No    Drug use: Not Currently     Comment: many years ago marijuana, cocaine    Sexual activity: Not on file   Other Topics Concern    Not on file   Social History Narrative    Not on file     Social Determinants of Health     Financial Resource Strain:     Difficulty of Paying Living Expenses: Not on file   Food Insecurity:     Worried About [address] in the Last Year: Not on file    [address] in the Last Year: Not on file   Transportation Needs:     Lack of Transportation (Medical): Not on file    Lack of Transportation (Non-Medical):  Not on file   Physical Activity:     Days of Exercise per Week: Not on file    Minutes of Exercise per Session: Not on file   Stress:     Feeling of Stress : Not on file   Social Connections:     Frequency of Communication with Friends and Family: Not on file    Frequency of Social Gatherings with Friends and Family: Not on file    Attends Zoroastrianism Services: Not on file    Active Member of Clubs or Organizations: Not on file    Attends Club or Organization Meetings: Not on file    Marital Status: Not on file   Intimate Partner Violence:     Fear of Current or Ex-Partner: Not on file    Emotionally Abused: Not on file    Physically Abused: Not on file    Sexually Abused: Not on file   Housing Stability:     Unable to Pay for Housing in the Last Year: Not on file    Number of [place] in the Last Year: Not on file    Unstable Housing in the Last Year: Not on file     Vitals:    11/19/21 1100   BP: 136/82   Site: Right Upper Arm   Position: Sitting   Cuff Size: Large Adult   Pulse: 88   Resp: 18   Temp: 97.9 °F (36.6 °C)   TempSrc: Infrared   Weight: (!) 441 lb 12.8 oz (200.4 kg)   Height: 6' 4\" (1.93 m)     Body mass index is 53.78 kg/m². Wt Readings from Last 3 Encounters:   11/19/21 (!) 441 lb 12.8 oz (200.4 kg)   10/01/21 (!) 441 lb 9.6 oz (200.3 kg)   09/25/20 (!) 456 lb 12.8 oz (207.2 kg)     Physical Exam  Vitals reviewed. Constitutional:       General: He is not in acute distress. Appearance: He is well-developed. He is not diaphoretic. HENT:      Head: Normocephalic and atraumatic. Right Ear: External ear normal.      Left Ear: External ear normal.      Nose: Nose normal.   Eyes:      General: No scleral icterus. Right eye: No discharge. Left eye: No discharge. Conjunctiva/sclera: Conjunctivae normal.   Cardiovascular:      Rate and Rhythm: Normal rate and regular rhythm. Heart sounds: Normal heart sounds. Pulmonary:      Effort: Pulmonary effort is normal. No respiratory distress. Breath sounds: Normal breath sounds. No wheezing or rales. Chest:      Chest wall: No tenderness. Abdominal:      General: Bowel sounds are normal. There is no distension. Palpations: Abdomen is soft. There is no mass. Tenderness: There is no abdominal tenderness.  There is no guarding or rebound. Musculoskeletal:         General: No tenderness. Normal range of motion. Cervical back: Normal range of motion and neck supple. Skin:     General: Skin is warm and dry. Coloration: Skin is not pale. Findings: No erythema or rash. Neurological:      Mental Status: He is alert and oriented to person, place, and time. Cranial Nerves: No cranial nerve deficit. Psychiatric:         Behavior: Behavior normal.         Thought Content: Thought content normal.         Judgment: Judgment normal.       Lab Results   Component Value Date    WBC 13.0 (H) 04/24/2012    HGB 17.7 04/24/2012    HCT 51.3 04/24/2012    MCV 93 04/24/2012     04/24/2012     Lab Results   Component Value Date     08/26/2016    K 5.0 08/26/2016     08/26/2016    CO2 27 08/26/2016     Lab Results   Component Value Date    CREATININE 0.8 08/26/2016     Lab Results   Component Value Date    ALT 35 08/26/2016    AST 28 08/26/2016    ALKPHOS 59 08/26/2016    BILITOT 0.6 08/26/2016     No results found for: LIPASE    Patient Active Problem List   Diagnosis    Morbid obesity with BMI of 50.0-59.9, adult ([facility] [address]    NATACHA on CPAP       An electronic signature was used to authenticate this note.     --Carter Araiza MD

## 2021-12-14 NOTE — PROGRESS NOTES
Patient is a 42 y.o. male seen for   initial  MNT visit for  pre op bariatric surgery desires sleeve    BMI: Body mass index is 53.78 kg/m². Obesity Classification: Class III    Weight History: Wt Readings from Last 3 Encounters:   12/16/21 (!) 441 lb 12.8 oz (200.4 kg)   11/19/21 (!) 441 lb 12.8 oz (200.4 kg)   10/01/21 (!) 441 lb 9.6 oz (200.3 kg)     Pt has CPAP machine    Patient is taking a Multivitamin daily:  Pt does take Magnesium, Zinc, Mens MVI One A Day - one tablet daily  Started Lexapro in November by PCP and reports this has been helpful with mood. Describe your current weight: Pt reports increased fatigue as the work week continues and current weight affecting this. How does your weight affect your daily activities? concern over medical problems, limited social activities, inability to spend time with family . Graduated high school at 320 lbs  Patient's lowest adult weight was 350 lbs at age early 21's. The lowest weight was achieved through younger age, prior to getting . States after got  weight started to increase even more  Patient's highest adult weight was 475 lbs at age 31. Patient was at his highest weight for ~10 years. Did lose weight on own  over the last two years by stopping regular pop and pasta, less candy    Patient has participated in the following weight loss programs: [facility] - six months ago  . Patient has not participated in meal replacement/liquid diets. Patient has not participated in weight loss medications. Patient is not lactose intolerant. Patient does not have Latter-day/cultural food concerns. Patient does not have food allergies. Patient dines out to a sit down restaurant 1 time per month. Patient has fast food or picks up carry out 1 time per week. Grocery Shopping in household done by: wife  Cooking in household done by: wife and oldest  Kids ages 12 thru 2 .   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 Before work- Uses keto bread-two slices with 2 sausage patties and 2 slice of cheese. Coffee and half/half creamer and sugar free syup  Snack: no.- occasionally has nuts  Lunch: yes. 11:30am- brings leftovers from home- sandwich or [place] or fettunialfredo  Snack: no.  Dinner: yes. 6pm - dinners- [place] dish with cauliflower rice, beans and grilled chicken, meats, broccoli, cauliflower, green beans, fish  Snack: yes. - peanut butter pretzels  Or baked goods two cookies  Drinks throughout the day: 32 oz coffee in am, water- 5 bottles per day, gatorade zero, coke Zero but not daily ~ 2 times a week, occasional hot tea    Do you drink alcohol? No.     Patient does not meet the criteria for binge eating disorder. Patient does sometimes  have grazing on weekends. Patient does not have night eating. Patient does have a history of emotional eating or eating out of boredom. - per pt thinks is doing both    It does take an unusually large amount of food for the patient to feel comfortably full. Patient describes self as fast eater      Patient describes level of activity as sedentary outside of work. Pt open to walking      Assessment  Nutritional Needs: Goldvein-St Jeor = 2807 kcal x 1.2 (activity factor)= 3368 kcal - 500-1000 calories/day  (for 1-2 lb weight loss/week)= 9408-3620 calories per day  Food recall reveals tendency to have larger portion sizes at meal times and pt recognizes this. Good water intake    PES Statement:  Overweight/Obesity related to lack of exercise, sedentary lifestyle, unhealthy eating habits, and unsuccessful diet attempts as evidenced by  Body mass index is 53.78 kg/m². .    Surgery  Surgical procedure desired: gastric sleeve  Patient's greatest concern about having surgery is: nervous about surgery in general.  Patient describes the motivation for weight loss surgery to be: \"I want to have a better quality of life\".     The expectations following the surgery were reviewed in detail with patient today and patient made aware will require to eliminate carbonated beverages, aim for regular meal times, monitor portion sizes, and balanced meals. .   he does feel he can deal with the dietary restrictions. Patient states he does understand the consequences of not complying with post-op food guidelines. Patient states he understands the long term changes in food intake that will be necessary for all occasions after surgery for the rest of her life. Patient is deemed nutritionally appropriate to proceed if able to show progress towards nutrition behavior changes discussed today. Plan  Patient will begin working on recommendations from Pre-Weight Loss Surgery Behavior Change Goal Sheet that was reviewed today to assist with weight loss and establish a  long term healthy eating pattern. Individual goals set with patient to be reviewed at monthly office visits. Weight loss goal of -5% from initial weight at first visit with Dr Ritika Joy reviewed with patient to achieve over 6 month period per insurance requirements. Initial weight was: 441 lbs   3-5% Weight Loss goal will be minimum of: 22 lbs weight loss. Plan/Recommendations:    Educational handouts provided and reviewed with patient as follows: Online Support Groups, My Plate Picture Method, Portion Size Guide, Food Journal    -  Patient Instructions   Goals:  1. I will get the lab work done that is mailed to my mailing address before next office visit. You will need to fast 10-12 hours for this (no food or drink besides water). 2  I will aim for at least 64 oz of water each day (= 8 cups per day or four bottled orozco)  3. I will use my food journal to record meal times, serving sizes and bring back to next dietitian visit. 4   I will increase my physical activity by  Walking outside for 15-20 minutes 3-4 days a week  5. Initial weight loss goal of -5% is recommended over 6 month period.   This is a minimum of: 22 lbs from your weight when initially met with physician of: 441 lbs. -Followup visit: 4 weeks with dietitian.        Betty Choudhury RD, LD   Dietitian- Weight Management [address] [place] [address]

## 2021-12-16 ENCOUNTER — OFFICE VISIT (OUTPATIENT)
Dept: BARIATRICS/WEIGHT MGMT | Age: 46
End: 2021-12-16

## 2021-12-16 ENCOUNTER — TELEPHONE (OUTPATIENT)
Dept: FAMILY MEDICINE CLINIC | Age: 46
End: 2021-12-16

## 2021-12-16 VITALS — HEIGHT: 76 IN | WEIGHT: 315 LBS | BODY MASS INDEX: 38.36 KG/M2

## 2021-12-16 DIAGNOSIS — E66.01 MORBID OBESITY WITH BMI OF 50.0-59.9, ADULT (HCC): Primary | ICD-10-CM

## 2021-12-16 DIAGNOSIS — Z01.818 PRE-OP TESTING: ICD-10-CM

## 2021-12-16 DIAGNOSIS — Z13.21 MALNUTRITION SCREEN: ICD-10-CM

## 2021-12-16 NOTE — PATIENT INSTRUCTIONS
Goals: 1. I will get the lab work done that is mailed to my mailing address before next office visit. You will need to fast 10-12 hours for this (no food or drink besides water). 2  I will aim for at least 64 oz of water each day (= 8 cups per day or four bottled orozco)  3. I will use my food journal to record meal times, serving sizes and bring back to next dietitian visit. 4   I will increase my physical activity by  Walking outside for 15-20 minutes 3-4 days a week  5. Initial weight loss goal of -5% is recommended over 6 month period. This is a minimum of: 22 lbs from your weight when initially met with physician of: 441 lbs.

## 2021-12-16 NOTE — TELEPHONE ENCOUNTER
See 12/9/21 Kimberley msg. Pt called office asking if this paperwork has been completed. Call pt back. Please advise.

## 2021-12-23 ENCOUNTER — PATIENT MESSAGE (OUTPATIENT)
Dept: FAMILY MEDICINE CLINIC | Age: 46
End: 2021-12-23

## 2021-12-23 DIAGNOSIS — F41.1 GAD (GENERALIZED ANXIETY DISORDER): ICD-10-CM

## 2021-12-23 RX ORDER — ESCITALOPRAM OXALATE 10 MG/1
10 TABLET ORAL DAILY
Qty: 90 TABLET | Refills: 3 | Status: SHIPPED | OUTPATIENT
Start: 2021-12-23

## 2021-12-23 NOTE — TELEPHONE ENCOUNTER
From: Esha Major  To: Selma Watson  Sent: 12/23/2021 3:12 PM EST  Subject: Girtha Bismark    My prescription said I had no more refills. I didn't know if I needed to see you to set this up.

## 2021-12-28 DIAGNOSIS — G47.33 OBSTRUCTIVE SLEEP APNEA: Primary | ICD-10-CM

## 2022-01-14 ENCOUNTER — HOSPITAL ENCOUNTER (OUTPATIENT)
Dept: GENERAL RADIOLOGY | Age: 47
Discharge: HOME | End: 2022-01-14
Payer: MEDICARE

## 2022-01-14 ENCOUNTER — HOSPITAL ENCOUNTER (OUTPATIENT)
Age: 47
Discharge: HOME | End: 2022-01-14
Payer: MEDICARE

## 2022-01-14 DIAGNOSIS — Z13.21 MALNUTRITION SCREEN: ICD-10-CM

## 2022-01-14 DIAGNOSIS — Z01.818 PRE-OP TESTING: ICD-10-CM

## 2022-01-14 DIAGNOSIS — E66.01 MORBID OBESITY WITH BMI OF 50.0-59.9, ADULT (HCC): ICD-10-CM

## 2022-01-14 DIAGNOSIS — Z00.00 WELL ADULT EXAM: ICD-10-CM

## 2022-01-14 DIAGNOSIS — G47.33 OBSTRUCTIVE SLEEP APNEA: ICD-10-CM

## 2022-01-14 DIAGNOSIS — Z01.818 PRE-OP TESTING: Primary | ICD-10-CM

## 2022-01-14 LAB
ALBUMIN SERPL-MCNC: 4.6 G/DL (ref 3.5–5.1)
ALP BLD-CCNC: 63 U/L (ref 38–126)
ALT SERPL-CCNC: 47 U/L (ref 11–66)
AMPHETAMINE+METHAMPHETAMINE URINE SCREEN: NEGATIVE
ANION GAP SERPL CALCULATED.3IONS-SCNC: 13 MEQ/L (ref 8–16)
AST SERPL-CCNC: 31 U/L (ref 5–40)
AVERAGE GLUCOSE: 111 MG/DL (ref 70–126)
BARBITURATE QUANTITATIVE URINE: NEGATIVE
BASOPHILS # BLD: 1.3 %
BASOPHILS ABSOLUTE: 0.1 THOU/MM3 (ref 0–0.1)
BENZODIAZEPINE QUANTITATIVE URINE: NEGATIVE
BILIRUB SERPL-MCNC: 0.6 MG/DL (ref 0.3–1.2)
BUN BLDV-MCNC: 16 MG/DL (ref 7–22)
CALCIUM SERPL-MCNC: 9.8 MG/DL (ref 8.5–10.5)
CANNABINOID QUANTITATIVE URINE: NEGATIVE
CHLORIDE BLD-SCNC: 105 MEQ/L (ref 98–111)
CHOLESTEROL, TOTAL: 201 MG/DL (ref 100–199)
CO2: 22 MEQ/L (ref 23–33)
COCAINE METABOLITE QUANTITATIVE URINE: NEGATIVE
CREAT SERPL-MCNC: 0.8 MG/DL (ref 0.4–1.2)
EKG ATRIAL RATE: 70 BPM
EKG P AXIS: 19 DEGREES
EKG P-R INTERVAL: 160 MS
EKG Q-T INTERVAL: 360 MS
EKG QRS DURATION: 86 MS
EKG QTC CALCULATION (BAZETT): 388 MS
EKG R AXIS: 62 DEGREES
EKG T AXIS: 9 DEGREES
EKG VENTRICULAR RATE: 70 BPM
EOSINOPHIL # BLD: 2.4 %
EOSINOPHILS ABSOLUTE: 0.1 THOU/MM3 (ref 0–0.4)
ERYTHROCYTE [DISTWIDTH] IN BLOOD BY AUTOMATED COUNT: 12.7 % (ref 11.5–14.5)
ERYTHROCYTE [DISTWIDTH] IN BLOOD BY AUTOMATED COUNT: 41.1 FL (ref 35–45)
FERRITIN: 335 NG/ML (ref 22–322)
FOLATE: 15.1 NG/ML (ref 4.8–24.2)
GFR SERPL CREATININE-BSD FRML MDRD: > 90 ML/MIN/1.73M2
GLUCOSE BLD-MCNC: 111 MG/DL (ref 70–108)
HBA1C MFR BLD: 5.7 % (ref 4.4–6.4)
HCT VFR BLD CALC: 49 % (ref 42–52)
HDLC SERPL-MCNC: 42 MG/DL
HEMOGLOBIN: 17 GM/DL (ref 14–18)
IMMATURE GRANS (ABS): 0.02 THOU/MM3 (ref 0–0.07)
IMMATURE GRANULOCYTES: 0.4 %
INR BLD: 0.95 (ref 0.85–1.13)
IRON: 127 UG/DL (ref 65–195)
LDL CHOLESTEROL CALCULATED: 137 MG/DL
LYMPHOCYTES # BLD: 32.2 %
LYMPHOCYTES ABSOLUTE: 1.8 THOU/MM3 (ref 1–4.8)
MCH RBC QN AUTO: 31.4 PG (ref 26–33)
MCHC RBC AUTO-ENTMCNC: 34.7 GM/DL (ref 32.2–35.5)
MCV RBC AUTO: 90.4 FL (ref 80–94)
MONOCYTES # BLD: 8.5 %
MONOCYTES ABSOLUTE: 0.5 THOU/MM3 (ref 0.4–1.3)
NUCLEATED RED BLOOD CELLS: 0 /100 WBC
OPIATES, URINE: NEGATIVE
OXYCODONE: NEGATIVE
PHENCYCLIDINE QUANTITATIVE URINE: NEGATIVE
PLATELET # BLD: 164 THOU/MM3 (ref 130–400)
PMV BLD AUTO: 8.8 FL (ref 9.4–12.4)
POTASSIUM SERPL-SCNC: 4.4 MEQ/L (ref 3.5–5.2)
PROSTATE SPECIFIC ANTIGEN: 0.54 NG/ML (ref 0–1)
PTH INTACT: 39.2 PG/ML (ref 15–65)
RBC # BLD: 5.42 MILL/MM3 (ref 4.7–6.1)
SEG NEUTROPHILS: 55.2 %
SEGMENTED NEUTROPHILS ABSOLUTE COUNT: 3 THOU/MM3 (ref 1.8–7.7)
SODIUM BLD-SCNC: 140 MEQ/L (ref 135–145)
TOTAL IRON BINDING CAPACITY: 316 UG/DL (ref 171–450)
TOTAL PROTEIN: 7.6 G/DL (ref 6.1–8)
TRIGL SERPL-MCNC: 109 MG/DL (ref 0–199)
TSH SERPL DL<=0.05 MIU/L-ACNC: 1.63 UIU/ML (ref 0.4–4.2)
VITAMIN B-12: 770 PG/ML (ref 211–911)
VITAMIN D 25-HYDROXY: 25 NG/ML (ref 30–100)
WBC # BLD: 5.5 THOU/MM3 (ref 4.8–10.8)

## 2022-01-14 PROCEDURE — 93005 ELECTROCARDIOGRAM TRACING: CPT | Performed by: PHYSICIAN ASSISTANT

## 2022-01-14 PROCEDURE — G0103 PSA SCREENING: HCPCS

## 2022-01-14 PROCEDURE — 83550 IRON BINDING TEST: CPT

## 2022-01-14 PROCEDURE — 84443 ASSAY THYROID STIM HORMONE: CPT

## 2022-01-14 PROCEDURE — 83036 HEMOGLOBIN GLYCOSYLATED A1C: CPT

## 2022-01-14 PROCEDURE — 82306 VITAMIN D 25 HYDROXY: CPT

## 2022-01-14 PROCEDURE — 71046 X-RAY EXAM CHEST 2 VIEWS: CPT

## 2022-01-14 PROCEDURE — 85610 PROTHROMBIN TIME: CPT

## 2022-01-14 PROCEDURE — 85025 COMPLETE CBC W/AUTO DIFF WBC: CPT

## 2022-01-14 PROCEDURE — 83540 ASSAY OF IRON: CPT

## 2022-01-14 PROCEDURE — 80061 LIPID PANEL: CPT

## 2022-01-14 PROCEDURE — 82607 VITAMIN B-12: CPT

## 2022-01-14 PROCEDURE — 80053 COMPREHEN METABOLIC PANEL: CPT

## 2022-01-14 PROCEDURE — 82728 ASSAY OF FERRITIN: CPT

## 2022-01-14 PROCEDURE — 80307 DRUG TEST PRSMV CHEM ANLYZR: CPT

## 2022-01-14 PROCEDURE — 82746 ASSAY OF FOLIC ACID SERUM: CPT

## 2022-01-14 PROCEDURE — 36415 COLL VENOUS BLD VENIPUNCTURE: CPT

## 2022-01-14 PROCEDURE — G0480 DRUG TEST DEF 1-7 CLASSES: HCPCS

## 2022-01-14 PROCEDURE — 83970 ASSAY OF PARATHORMONE: CPT

## 2022-01-14 PROCEDURE — 84425 ASSAY OF VITAMIN B-1: CPT

## 2022-01-18 LAB
3-OH-COTININE: < 2 NG/ML
COTININE: < 2 NG/ML
NICOTINE: < 2 NG/ML

## 2022-01-19 DIAGNOSIS — E55.9 VITAMIN D DEFICIENCY: Primary | ICD-10-CM

## 2022-01-19 RX ORDER — CHOLECALCIFEROL (VITAMIN D3) 1250 MCG
1 CAPSULE ORAL WEEKLY
Qty: 4 CAPSULE | Refills: 2 | Status: SHIPPED | OUTPATIENT
Start: 2022-01-19 | End: 2022-05-20

## 2022-01-21 ENCOUNTER — OFFICE VISIT (OUTPATIENT)
Dept: BARIATRICS/WEIGHT MGMT | Age: 47
End: 2022-01-21
Payer: MEDICARE

## 2022-01-21 ENCOUNTER — OFFICE VISIT (OUTPATIENT)
Dept: BARIATRICS/WEIGHT MGMT | Age: 47
End: 2022-01-21

## 2022-01-21 VITALS
SYSTOLIC BLOOD PRESSURE: 144 MMHG | TEMPERATURE: 96.4 F | HEART RATE: 76 BPM | HEIGHT: 76 IN | DIASTOLIC BLOOD PRESSURE: 72 MMHG | BODY MASS INDEX: 38.36 KG/M2 | WEIGHT: 315 LBS

## 2022-01-21 DIAGNOSIS — N20.0 NEPHROLITHIASIS: ICD-10-CM

## 2022-01-21 DIAGNOSIS — E66.01 MORBID OBESITY WITH BMI OF 50.0-59.9, ADULT (HCC): Primary | ICD-10-CM

## 2022-01-21 DIAGNOSIS — I10 HYPERTENSION, UNSPECIFIED TYPE: ICD-10-CM

## 2022-01-21 DIAGNOSIS — F32.A DEPRESSION, UNSPECIFIED DEPRESSION TYPE: ICD-10-CM

## 2022-01-21 DIAGNOSIS — F41.9 ANXIETY: ICD-10-CM

## 2022-01-21 DIAGNOSIS — G47.33 OBSTRUCTIVE SLEEP APNEA: ICD-10-CM

## 2022-01-21 DIAGNOSIS — E55.9 VITAMIN D DEFICIENCY: ICD-10-CM

## 2022-01-21 LAB — VITAMIN B1 WHOLE BLOOD: 167 NMOL/L (ref 70–180)

## 2022-01-21 PROCEDURE — G8484 FLU IMMUNIZE NO ADMIN: HCPCS | Performed by: SURGERY

## 2022-01-21 PROCEDURE — 1036F TOBACCO NON-USER: CPT | Performed by: SURGERY

## 2022-01-21 PROCEDURE — G8427 DOCREV CUR MEDS BY ELIG CLIN: HCPCS | Performed by: SURGERY

## 2022-01-21 PROCEDURE — 99213 OFFICE O/P EST LOW 20 MIN: CPT | Performed by: SURGERY

## 2022-01-21 PROCEDURE — G8417 CALC BMI ABV UP PARAM F/U: HCPCS | Performed by: SURGERY

## 2022-01-21 RX ORDER — LORATADINE 10 MG/1
10 TABLET ORAL DAILY
COMMUNITY

## 2022-01-21 NOTE — PROGRESS NOTES
Assessment: Patient is a 42 y.o. male seen for   month one  follow up MNT visit for  pre op bariatric surgery desires sleeve    Vitals from current and previous visits:  Vitals 4/51/7948   SYSTOLIC 221   DIASTOLIC 72   Site Right Upper Arm   Position Sitting   Cuff Size Large Adult   Pulse 76   Temp 96.4   Resp    SpO2    Weight 447 lb   Height 6' 4\"   Body mass index 54.4 kg/m2   Waist (Inches)    Neck circumference        Initial weight at start of Weight Management Program was: 441 lbs     [place] gained 6 lbs over one month  -Weight goal: lose weight. States not surprised with weight gain as was in [place] on vacation for 12 days and reports ate poorly    -Nutritionally relevant labs:  initial labs- Vit D-25, glucose-111, ferritin-335, iron-127, B1-167  Lab Results   Component Value Date/Time    LABA1C 5.7 01/14/2022 09:15 AM    LABA1C 5.4 08/26/2016 11:12 AM    LABA1C 5.8 03/11/2013 09:59 AM    GLUCOSE 111 (H) 01/14/2022 09:55 AM    GLUCOSE 110 (H) 08/26/2016 11:12 AM    CHOL 201 (H) 01/14/2022 09:55 AM    HDL 42 01/14/2022 09:55 AM    LDLCALC 137 01/14/2022 09:55 AM    TRIG 109 01/14/2022 09:55 AM                  Lab Results   Component Value Date    VITD25 25 01/14/2022     Has CPAP machine. 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 Verbal recall takne  Breakfast:  _Keto granola bar- 10-25 grams protein bar    Snack first break- 9am- apples or grapes or nuts. Lunch: packs lunch for work- may bring chicken or pork chops to work with broccoli, or cauliflower, some beans. Or peanut butter pretzersl  Dinner: last nite for dinner had chicken nuggets, sausage and cabbage- egg roll in bowl.    Snacks: last nite had a couple cookies and 4-5 oz cooked ham.   Main Beverages: down to 24 oz coffee instead of 32 oz, water- 5 -6bottles per day, gatorade zero, coke Zero usually one cup a day, occasional hot tea       -Impression of Dietary Intake: working on portion sizes and not skipping meals - Pt  is working towards avoiding high fat/high sugar foods. Pt  is working towards  including protein at meals and snacks. Exercise:  -Physical Activity is:  \"More cleaning and be more active in the garage\". Goal reviewed with pt from last month of starting some dedicated walking. Did recently get a stepper and will start tracking steps. Pt encouraged to start the dedicated walking      Nutrition Diagnosis: Overweight/Obesity related  currently undergoing MNT as evidenced by  Body mass index is 54.41 kg/m². .    Intervention:   Healthy behaviors: adequate fluid intake  Patient has not attended support groups via You Tube yet. Pt requires to bring in food journal for a more thorough review of food choices and assist with weight loss efforts. Pt willing to eliminate carbonated beverages but voices not ready to decrease further amount of coffee drinking as states this will be more difficult to do. Educational handouts and monthly goals reviewed with patient. Handouts given: Top Notch Protein Foods and Reducing Fat and Calories in Meal Planning  . Goals:  1. Nutrition Goal:  I will bring back my food journal to next office visit. Record meal times, serving sizes and bring back to next dietitian visit  2. Water Goal:  Great job with water intake- continue to drink at least 5 bottles or greater. Goal is no pop! 3. Exercise Goal:  Start tracking daily steps to see where you are at. Try to add walk outside at least three days a week when not working. 4.  Can view online You Tube Videos as support groups. -Followup visit: 4 weeks with dietitian.          Concepción Neff, RD, LD,   Dietitian- Weight Management [facility] [address]

## 2022-01-21 NOTE — PATIENT INSTRUCTIONS
Goals: 1. Nutrition Goal:  I will bring back my food journal to next office visit. Record meal times, serving sizes and bring back to next dietitian visit  2. Water Goal:  Great job with water intake- continue to drink at least 5 bottles or greater. Goal is no pop! 3. Exercise Goal:  Start tracking daily steps to see where you are at. Try to add walk outside at least three days a week when not working. 4.  Can view online You Tube Videos as support groups.

## 2022-01-22 ASSESSMENT — ENCOUNTER SYMPTOMS
EYE ITCHING: 0
BLOOD IN STOOL: 0
FACIAL SWELLING: 0
VOICE CHANGE: 0
COLOR CHANGE: 0
CHEST TIGHTNESS: 0
CHOKING: 0
SINUS PRESSURE: 0
PHOTOPHOBIA: 0
CONSTIPATION: 0
SORE THROAT: 0
ABDOMINAL DISTENTION: 0
TROUBLE SWALLOWING: 0
BACK PAIN: 0
ANAL BLEEDING: 0
APNEA: 0
EYE DISCHARGE: 0
SHORTNESS OF BREATH: 0
RECTAL PAIN: 0
ALLERGIC/IMMUNOLOGIC NEGATIVE: 1
ABDOMINAL PAIN: 0
VOMITING: 0
EYE REDNESS: 0
WHEEZING: 0
RHINORRHEA: 0
DIARRHEA: 0
STRIDOR: 0
EYE PAIN: 0
NAUSEA: 0
COUGH: 0

## 2022-01-22 NOTE — PROGRESS NOTES
Amberly Day (:  1975)     ASSESSMENT:  1. Morbid obesity (BMI 53)  2. Anxiety  3. Depression  4. Nephrolithiasis  5. Hypertension  6. Depression  7. Sleep apnea  8. Vitamin D deficiency    PLAN:  1. Discussion again about the pros and cons of weight loss surgery. The risks benefits and alternatives to laparoscopic adjustable band, gastric sleeve and gastric Camacho-en-Y bypass were discussed in detail. The pros and cons of robotic assisted, laparoscopic and open techniques were discussed. 2.  Behavior modification discussed again in regards to dietary habits. 3.  Nutritional education occurred during visit. Follow-up with dietitian for further evaluation. Follow recommendations as directed. 4.  Options for medical management of morbid obesity discussed. 5.  Improvement in fitness/exercise discussed with patient and the need for this with/without surgery. 6.  Medical necessity letter from PCP. 7.  Follow-up in one month at weight management program at [facility]. 8.  Signs and symptoms reviewed with patient that would be concerning and need him to return to office for re-evaluation. Patient states he will call if he has questions or concerns. 9. Multivitamin  10. Psychology evaluation in process  11. EGD prior to any surgical intervention  12. Encouraged support groups  13. Encouraged Naturally Slim/Rev It Up  14. Discussed the pros and cons along with possible side effects/complications of weight loss medications. All questions answered. Patient states that if he is not able to lose enough adequate excess body weight with medical management only then he would be like to proceed with surgical intervention such as sleeve gastrectomy/gastric bypass for further weight loss. More than 20 minutes spent with patient today. Greater than 50% of the time was involved counseling, educaton and coordinating care.     SUBJECTIVE/OBJECTIVE:    Chief Complaint   Patient presents with    Weight Management     HPI  Lenore Neri is a 42-year-old male who presents for follow-up of the weight management program secondary to his morbid obesity. BMI 54. Current weight 447 pounds. He started the program of 441 pounds. He admits he is up 6 pounds as he has recently come back from vacation. Admits he did not do well on vacation with eating and exercising. He ate very poorly in [place] during that 12 days. He states he is now getting back on track. Following with the dietitian. Working on portion control and food selection. He has been trying to become more active to burn more calories. No routine weekly physical activity regimen. In process with psychology clearance. He is taking a multivitamin. Replacing his vitamin D 3. Denies chest or abdominal pain. No hematochezia or melena. No urinary complaints. He admits that if he is not able to lose enough adequate excess body weight with medical management only he would still like to proceed with surgical intervention if possible. Review of Systems   Constitutional: Negative for activity change, appetite change, chills, diaphoresis, fatigue, fever and unexpected weight change. HENT: Negative for congestion, dental problem, drooling, ear discharge, ear pain, facial swelling, hearing loss, mouth sores, nosebleeds, postnasal drip, rhinorrhea, sinus pressure, sneezing, sore throat, tinnitus, trouble swallowing and voice change. Eyes: Negative for photophobia, pain, discharge, redness, itching and visual disturbance. Respiratory: Negative for apnea, cough, choking, chest tightness, shortness of breath, wheezing and stridor. Cardiovascular: Negative for chest pain, palpitations and leg swelling. Gastrointestinal: Negative for abdominal distention, abdominal pain, anal bleeding, blood in stool, constipation, diarrhea, nausea, rectal pain and vomiting. Endocrine: Negative.     Genitourinary: Negative for decreased urine volume, difficulty urinating, dysuria, enuresis, flank pain, frequency, genital sores, hematuria, penile discharge, penile pain, penile swelling, scrotal swelling, testicular pain and urgency. Musculoskeletal: Negative for arthralgias, back pain, gait problem, joint swelling, myalgias, neck pain and neck stiffness. Skin: Negative for color change, pallor, rash and wound. Allergic/Immunologic: Negative. Neurological: Negative for dizziness, tremors, seizures, syncope, facial asymmetry, speech difficulty, weakness, light-headedness, numbness and headaches. Hematological: Negative for adenopathy. Does not bruise/bleed easily. Psychiatric/Behavioral: Negative for agitation, behavioral problems, confusion, decreased concentration, dysphoric mood, hallucinations, self-injury, sleep disturbance and suicidal ideas. The patient is not nervous/anxious and is not hyperactive. Past Medical History:   Diagnosis Date    Anxiety     Depression     Hypertension     Kidney stone        Past Surgical History:   Procedure Laterality Date    CYSTOSCOPY Left 3/14/13    cystoscopy, left ureteroscopy, laser lithotripsy, left stent insertion       Current Outpatient Medications   Medication Sig Dispense Refill    loratadine (CLARITIN) 10 MG tablet Take 10 mg by mouth daily      Cholecalciferol (VITAMIN D3) 1.25 MG (30750 UT) CAPS Take 1 capsule by mouth once a week (Patient not taking: Reported on 1/21/2022) 4 capsule 2    escitalopram (LEXAPRO) 10 MG tablet Take 1 tablet by mouth daily 90 tablet 3    Ascorbic Acid (VITAMIN C PO) Take by mouth daily      MAGNESIUM PO Take by mouth daily      Multiple Vitamin (MULTI-VITAMIN PO) Take by mouth daily      fluticasone (FLONASE) 50 MCG/ACT nasal spray instill 2 sprays into each nostril once daily 48 g 3     No current facility-administered medications for this visit.        No Known Allergies    Family History   Problem Relation Age of Onset    Cancer Mother thyroid    High Blood Pressure Mother     Diabetes Maternal Grandmother     Obesity Maternal Grandmother     Heart Disease Maternal Grandfather     Diabetes Paternal Grandmother     Obesity Paternal Grandmother     Heart Disease Paternal Grandfather        Social History     Socioeconomic History    Marital status:      Spouse name: Tomasa    Number of children: 11    Years of education: 15    Highest education level: High school graduate   Occupational History    Not on file   Tobacco Use    Smoking status: Former Smoker     Types: Cigarettes     Quit date: 3/5/2000     Years since quittin.8    Smokeless tobacco: Former User     Types: Snuff     Quit date: 3/5/2000   Vaping Use    Vaping Use: Never used   Substance and Sexual Activity    Alcohol use: No    Drug use: Not Currently     Comment: many years ago marijuana, cocaine    Sexual activity: Not on file   Other Topics Concern    Not on file   Social History Narrative    Not on file     Social Determinants of Health     Financial Resource Strain:     Difficulty of Paying Living Expenses: Not on file   Food Insecurity:     Worried About [email or web address] in the Last Year: Not on file    [address] in the Last Year: Not on file   Transportation Needs:     Lack of Transportation (Medical): Not on file    Lack of Transportation (Non-Medical):  Not on file   Physical Activity:     Days of Exercise per Week: Not on file    Minutes of Exercise per Session: Not on file   Stress:     Feeling of Stress : Not on file   Social Connections:     Frequency of Communication with Friends and Family: Not on file    Frequency of Social Gatherings with Friends and Family: Not on file    Attends Bahai Services: Not on file    Active Member of Clubs or Organizations: Not on file    Attends Club or Organization Meetings: Not on file    Marital Status: Not on file   Intimate Partner Violence:     Fear of Current or Ex-Partner: Not on file    Emotionally Abused: Not on file    Physically Abused: Not on file    Sexually Abused: Not on file   Housing Stability:     Unable to Pay for Housing in the Last Year: Not on file    Number of Places Lived in the Last Year: Not on file    Unstable Housing in the Last Year: Not on file     There were no vitals filed for this visit. There is no height or weight on file to calculate BMI. Wt Readings from Last 3 Encounters:   01/21/22 (!) 447 lb (202.8 kg)   12/16/21 (!) 441 lb 12.8 oz (200.4 kg)   11/19/21 (!) 441 lb 12.8 oz (200.4 kg)     Physical Exam  Vitals reviewed. Constitutional:       General: He is not in acute distress. Appearance: He is well-developed. He is not diaphoretic. HENT:      Head: Normocephalic and atraumatic. Right Ear: External ear normal.      Left Ear: External ear normal.      Nose: Nose normal.   Eyes:      General: No scleral icterus. Right eye: No discharge. Left eye: No discharge. Conjunctiva/sclera: Conjunctivae normal.   Cardiovascular:      Rate and Rhythm: Normal rate and regular rhythm. Heart sounds: Normal heart sounds. Pulmonary:      Effort: Pulmonary effort is normal. No respiratory distress. Breath sounds: Normal breath sounds. No wheezing or rales. Chest:      Chest wall: No tenderness. Abdominal:      General: Bowel sounds are normal. There is no distension. Palpations: Abdomen is soft. There is no mass. Tenderness: There is no abdominal tenderness. There is no guarding or rebound. Musculoskeletal:         General: No tenderness. Normal range of motion. Cervical back: Normal range of motion and neck supple. Skin:     General: Skin is warm and dry. Coloration: Skin is not pale. Findings: No erythema or rash. Neurological:      Mental Status: He is alert and oriented to person, place, and time. Cranial Nerves: No cranial nerve deficit.    Psychiatric:         Behavior: Behavior normal.         Thought Content:  Thought content normal.         Judgment: Judgment normal.       CBC   Lab Results   Component Value Date    WBC 5.5 01/14/2022    RBC 5.42 01/14/2022    RBC 5.51 04/24/2012    HGB 17.0 01/14/2022    HCT 49.0 01/14/2022    MCV 90.4 01/14/2022    MCH 31.4 01/14/2022    MCHC 34.7 01/14/2022    RDW 11.0 04/24/2012     01/14/2022    MPV 8.8 01/14/2022    RBCMORP NORMAL 04/24/2012    SEGSPCT 55.2 01/14/2022    LABLYMP 32.2 01/14/2022    LABLYMP 5.6 04/24/2012    MONOPCT 8.5 01/14/2022    LABEOS 2.4 01/14/2022    BASO 1.3 01/14/2022    NRBC 0 01/14/2022    SEGSABS 3.0 01/14/2022    LYMPHSABS 1.8 01/14/2022    MONOSABS 0.5 01/14/2022    EOSABS 0.1 01/14/2022    BASOSABS 0.1 01/14/2022      BMP/CMP   Lab Results   Component Value Date    GLUCOSE 111 01/14/2022    CREATININE 0.8 01/14/2022    BUN 16 01/14/2022     01/14/2022    K 4.4 01/14/2022     01/14/2022    CO2 22 01/14/2022    CALCIUM 9.8 01/14/2022    AST 31 01/14/2022    ALKPHOS 63 01/14/2022    PROT 7.6 01/14/2022    LABALBU 4.6 01/14/2022    BILITOT 0.6 01/14/2022    ALT 47 01/14/2022      PREALBUMIN   No results found for: PREALBUMIN   VITAMIN B12   Lab Results   Component Value Date    TOTQJUBJ39 770 01/14/2022      VITAMIN D   Lab Results   Component Value Date    VITD25 25 01/14/2022      PTH  Lab Results   Component Value Date    IPTH 39.2 01/14/2022     VITAMIN B1/ THIAMINE   Lab Results   Component Value Date    DSDQ0PXSUIA 167 01/14/2022      LIPID SCREEN (FASTING)   Lab Results   Component Value Date    CHOL 201 01/14/2022    TRIG 109 01/14/2022    HDL 42 01/14/2022    LDLCALC 137 01/14/2022   ,   HGA1C (T2DM ONLY)   Lab Results   Component Value Date    LABA1C 5.7 01/14/2022    AVGG 111 01/14/2022      TSH   Lab Results   Component Value Date    TSH 1.630 01/14/2022      IRON   Lab Results   Component Value Date    IRON 127 01/14/2022      TIBC  Lab Results   Component Value Date    TIBC 316 01/14/2022     FERRITIN  Lab Results   Component Value Date    FERRITIN 335 01/14/2022     VITAMIN A  No results found for: RETINOL  NICOTINE  No results found for: NMET  UDS  No results found for: UDP  PSA  No results found for: LABPSA  GFR  Lab Results   Component Value Date    LABGLOM >90 01/14/2022     DEXA  No results found for this or any previous visit. Patient Active Problem List   Diagnosis    Morbid obesity with BMI of 50.0-59.9, adult ([facility] [address]    NATACHA on CPAP       An electronic signature was used to authenticate this note.     --Loulou Crystal MD

## 2022-03-04 ENCOUNTER — OFFICE VISIT (OUTPATIENT)
Dept: PSYCHOLOGY | Age: 47
End: 2022-03-04
Payer: MEDICARE

## 2022-03-04 DIAGNOSIS — F43.21 ADJUSTMENT DISORDER WITH DEPRESSED MOOD: Primary | ICD-10-CM

## 2022-03-04 DIAGNOSIS — E66.01 MORBID OBESITY (HCC): ICD-10-CM

## 2022-03-04 DIAGNOSIS — Z87.828 HISTORY OF TRAUMA: ICD-10-CM

## 2022-03-04 PROCEDURE — 96131 PSYCL TST EVAL PHYS/QHP EA: CPT | Performed by: PSYCHOLOGIST

## 2022-03-04 PROCEDURE — 1036F TOBACCO NON-USER: CPT | Performed by: PSYCHOLOGIST

## 2022-03-04 PROCEDURE — 90791 PSYCH DIAGNOSTIC EVALUATION: CPT | Performed by: PSYCHOLOGIST

## 2022-03-04 PROCEDURE — 96130 PSYCL TST EVAL PHYS/QHP 1ST: CPT | Performed by: PSYCHOLOGIST
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
## 2022-03-04 NOTE — PATIENT INSTRUCTIONS
Assertiveness Is Simple but HARD  Nonassertive     Assertive    Aggressive  (Passive)                                 (Tactful)                (Rude)  ? H onest   ? H onest    ? H onest  ? A ppropriate    ? A ppropriate     ? A ppropriate   ? R espectful   ? R espectful    ? R espectful  ? D irect    ? D irect     ? D irect    Assertiveness involves respecting your rights and the rights of others. Important Facts About Assertiveness   Use I or me statements such as When you do ______, I feel _____.    Your voice tone, eye contact, and body posture are important parts of assertive communication.  Use a steady calm voice, stand or sit up straight, look the other person in the eyes without glaring.  Feelings are usually only one word (e.g., angry, anxious, happy, sad, hurt, frustrated, joyful, etc.)   Remember, assertiveness doesnt guarantee that you will get what you want or that the other person will understand your concerns or be happy with what you said. It does improve the chances that the other person will understand what you want or how you feel and thus improve your chances of communicating effectively. Four Essential Steps to Assertive Communication  1. Tell the person what you think about his or her behavior without accusations. 2. Tell the person how you feel when he or she behaves a certain way. 3. Tell the person how his or her behavior affects you and your relationship. 4. Tell the person what you would prefer he or she do instead. XYZ* Formula for Effective Communication  Goal: To express the way you feel (internal world) in response to others behavior (external world) in specific situations. You are the only person who has access to your feelings. Others have no access to your internal world. The only way they will know what you are feeling is if you tell them. Similarly, you only have access to other peoples external world.  It is very easy to make a mistake when trying to guess what others are feeling or intending. I feel X when you do Y in situation Z and I would like *   I feel angry when you leave your socks and underwear on the bedroom floor after work and I would like you to put them in the hamper. I felt insignificant when you left me with an empty gas tank  yesterday and I would like you to leave the car with at least a quarter tank of gas. I feel angry when you dont call me if you are staying late at work and I would like you to call as soon as you know you will be late. I feel loved when you kiss me when you get home and I would like you to do that every day.

## 2022-03-11 ENCOUNTER — OFFICE VISIT (OUTPATIENT)
Dept: BARIATRICS/WEIGHT MGMT | Age: 47
End: 2022-03-11

## 2022-03-11 VITALS
RESPIRATION RATE: 18 BRPM | BODY MASS INDEX: 38.36 KG/M2 | SYSTOLIC BLOOD PRESSURE: 136 MMHG | WEIGHT: 315 LBS | HEIGHT: 76 IN | DIASTOLIC BLOOD PRESSURE: 82 MMHG | HEART RATE: 66 BPM

## 2022-03-11 DIAGNOSIS — I10 HYPERTENSION, UNSPECIFIED TYPE: ICD-10-CM

## 2022-03-11 DIAGNOSIS — E55.9 VITAMIN D DEFICIENCY: ICD-10-CM

## 2022-03-11 DIAGNOSIS — E66.01 MORBID OBESITY WITH BMI OF 50.0-59.9, ADULT (HCC): Primary | ICD-10-CM

## 2022-03-11 DIAGNOSIS — N20.0 NEPHROLITHIASIS: ICD-10-CM

## 2022-03-11 DIAGNOSIS — F41.9 ANXIETY: ICD-10-CM

## 2022-03-11 DIAGNOSIS — F32.A DEPRESSION, UNSPECIFIED DEPRESSION TYPE: ICD-10-CM

## 2022-03-11 DIAGNOSIS — G47.33 OBSTRUCTIVE SLEEP APNEA: ICD-10-CM

## 2022-03-11 PROCEDURE — 99213 OFFICE O/P EST LOW 20 MIN: CPT | Performed by: SURGERY

## 2022-03-11 ASSESSMENT — ENCOUNTER SYMPTOMS
CONSTIPATION: 0
NAUSEA: 1
EYE REDNESS: 0
VOICE CHANGE: 0
EYE ITCHING: 0
WHEEZING: 0
CHOKING: 0
EYE DISCHARGE: 0
ABDOMINAL DISTENTION: 0
SINUS PRESSURE: 0
STRIDOR: 0
ALLERGIC/IMMUNOLOGIC NEGATIVE: 1
BLOOD IN STOOL: 0
BACK PAIN: 0
RECTAL PAIN: 0
CHEST TIGHTNESS: 0
TROUBLE SWALLOWING: 0
RHINORRHEA: 0
COUGH: 0
FACIAL SWELLING: 0
COLOR CHANGE: 0
DIARRHEA: 0
VOMITING: 0
ABDOMINAL PAIN: 0
APNEA: 0
PHOTOPHOBIA: 0
ANAL BLEEDING: 0
EYE PAIN: 0
SORE THROAT: 0
SHORTNESS OF BREATH: 0

## 2022-03-11 NOTE — PROGRESS NOTES
Stacia Clarke (:  1975)     ASSESSMENT:  1. Morbid obesity (BMI 54)  2. Anxiety  3. Depression  4. Nephrolithiasis  5. Hypertension  6. Depression  7. Sleep apnea  8. Vitamin D deficiency    PLAN:  1. Discussion again about the pros and cons of weight loss surgery. The risks benefits and alternatives to laparoscopic adjustable band, gastric sleeve and gastric Camacho-en-Y bypass were discussed in detail. The pros and cons of robotic assisted, laparoscopic and open techniques were discussed. 2.  Behavior modification discussed again in regards to dietary habits. 3.  Nutritional education occurred during visit. Follow-up with dietitian for further evaluation. Follow recommendations as directed. 4.  Options for medical management of morbid obesity again discussed. 5.  Improvement in fitness/exercise discussed with patient and the need for this with/without surgery. 6.  Medical necessity letter from PCP. 7.  Follow-up in one month at weight management program at [facility]. 8.  Signs and symptoms reviewed with patient that would be concerning and need him to return to office for re-evaluation. Patient states he will call if he has questions or concerns. 9. Multivitamin  10. Psychology evaluation in process  11. EGD prior to any surgical intervention  12. Encouraged support groups  13. Encouraged Naturally Slim/Rev It Up  14. Discussed the pros and cons along with possible side effects/complications of weight loss medications. All questions answered. 15.  Following up with pulmonary service    --Patient states that if he is not able to lose enough adequate excess body weight with medical management only then he would be like to proceed with surgical intervention such as sleeve gastrectomy/gastric bypass for further weight loss.     --More than 20 minutes spent with patient today.   Greater than 50% of the time was involved counseling, educaton and coordinating care.    SUBJECTIVE/OBJECTIVE:    Chief Complaint   Patient presents with    Follow-up     month 3 of 6; desires sleeotoniel YEE  Fritz Garcia is a 56-year-old male who presents for follow-up of the weight management program secondary to his morbid obesity. BMI 54. Current weight 444 pounds. Last evaluation weight was 447 pounds. He has lost 3 pounds since the last time I saw him. He is following with the dietitian. Continuing to work on portion control and food selection. Following up with psychology. Working on behavior related issues with eating. Taking multivitamin. Vitamin D3. Trying to become more active. Following up with pulmonary service. Has not completed upper endoscopy yet. Denies current chest or abdominal pain. No hematochezia or melena. No new urinary complaints. Staying well-hydrated. Trying to eat smaller meals. Working on not skipping meals. Admits that if he is not able to lose enough adequate excess body weight he would still like to proceed with surgical intervention. Review of Systems   Constitutional: Negative for activity change, appetite change, chills, diaphoresis, fatigue, fever and unexpected weight change. HENT: Negative for congestion, dental problem, drooling, ear discharge, ear pain, facial swelling, hearing loss, mouth sores, nosebleeds, postnasal drip, rhinorrhea, sinus pressure, sneezing, sore throat, tinnitus, trouble swallowing and voice change. Eyes: Negative for photophobia, pain, discharge, redness, itching and visual disturbance. Respiratory: Negative for apnea, cough, choking, chest tightness, shortness of breath, wheezing and stridor. Cardiovascular: Negative for chest pain, palpitations and leg swelling. Gastrointestinal: Positive for nausea. Negative for abdominal distention, abdominal pain, anal bleeding, blood in stool, constipation, diarrhea, rectal pain and vomiting. Endocrine: Negative.     Genitourinary: Negative for decreased urine volume, difficulty urinating, dysuria, enuresis, flank pain, frequency, genital sores, hematuria, penile discharge, penile pain, penile swelling, scrotal swelling, testicular pain and urgency. Musculoskeletal: Negative for arthralgias, back pain, gait problem, joint swelling, myalgias, neck pain and neck stiffness. Skin: Negative for color change, pallor, rash and wound. Allergic/Immunologic: Negative. Neurological: Negative for dizziness, tremors, seizures, syncope, facial asymmetry, speech difficulty, weakness, light-headedness, numbness and headaches. Hematological: Negative for adenopathy. Does not bruise/bleed easily. Psychiatric/Behavioral: Negative for agitation, behavioral problems, confusion, decreased concentration, dysphoric mood, hallucinations, self-injury, sleep disturbance and suicidal ideas. The patient is nervous/anxious. The patient is not hyperactive. Past Medical History:   Diagnosis Date    Anxiety     Depression     Hypertension     Kidney stone        Past Surgical History:   Procedure Laterality Date    CYSTOSCOPY Left 3/14/13    cystoscopy, left ureteroscopy, laser lithotripsy, left stent insertion       Current Outpatient Medications   Medication Sig Dispense Refill    5-Hydroxytryptophan (5-HTP PO) Take by mouth      loratadine (CLARITIN) 10 MG tablet Take 10 mg by mouth daily      Cholecalciferol (VITAMIN D3) 1.25 MG (71582 UT) CAPS Take 1 capsule by mouth once a week 4 capsule 2    escitalopram (LEXAPRO) 10 MG tablet Take 1 tablet by mouth daily 90 tablet 3    Ascorbic Acid (VITAMIN C PO) Take by mouth daily      MAGNESIUM PO Take by mouth daily      Multiple Vitamin (MULTI-VITAMIN PO) Take by mouth daily      fluticasone (FLONASE) 50 MCG/ACT nasal spray instill 2 sprays into each nostril once daily 48 g 3     No current facility-administered medications for this visit.        No Known Allergies    Family History   Problem Relation Age of Onset    Cancer Mother         thyroid    High Blood Pressure Mother     Diabetes Maternal Grandmother     Obesity Maternal Grandmother     Heart Disease Maternal Grandfather     Diabetes Paternal Grandmother     Obesity Paternal Grandmother     Heart Disease Paternal Grandfather        Social History     Socioeconomic History    Marital status:      Spouse name: Art Sheriff Number of children: 11    Years of education: 15    Highest education level: High school graduate   Occupational History    Not on file   Tobacco Use    Smoking status: Former Smoker     Types: Cigarettes     Quit date: 3/5/2000     Years since quittin.0    Smokeless tobacco: Former User     Types: Snuff     Quit date: 3/5/2000   Vaping Use    Vaping Use: Never used   Substance and Sexual Activity    Alcohol use: No    Drug use: Not Currently     Comment: many years ago marijuana, cocaine    Sexual activity: Not on file   Other Topics Concern    Not on file   Social History Narrative    Not on file     Social Determinants of Health     Financial Resource Strain:     Difficulty of Paying Living Expenses: Not on file   Food Insecurity:     Worried About [address] in the Last Year: Not on file    [address] in the Last Year: Not on file   Transportation Needs:     Lack of Transportation (Medical): Not on file    Lack of Transportation (Non-Medical):  Not on file   Physical Activity:     Days of Exercise per Week: Not on file    Minutes of Exercise per Session: Not on file   Stress:     Feeling of Stress : Not on file   Social Connections:     Frequency of Communication with Friends and Family: Not on file    Frequency of Social Gatherings with Friends and Family: Not on file    Attends Nondenominational Services: Not on file    Active Member of Clubs or Organizations: Not on file    Attends Club or Organization Meetings: Not on file    Marital Status: Not on file   Intimate Partner Violence:     Fear of Current or Ex-Partner: Not on file    Emotionally Abused: Not on file    Physically Abused: Not on file    Sexually Abused: Not on file   Housing Stability:     Unable to Pay for Housing in the Last Year: Not on file    Number of Places Lived in the Last Year: Not on file    Unstable Housing in the Last Year: Not on file     Vitals:    03/11/22 0813   BP: 136/82   Site: Right Upper Arm   Position: Sitting   Cuff Size: Large Adult   Pulse: 66   Resp: 18   Weight: (!) 444 lb 6.4 oz (201.6 kg)   Height: 6' 4\" (1.93 m)     Body mass index is 54.09 kg/m². Wt Readings from Last 3 Encounters:   03/11/22 (!) 444 lb 6.4 oz (201.6 kg)   01/21/22 (!) 447 lb (202.8 kg)   12/16/21 (!) 441 lb 12.8 oz (200.4 kg)     Physical Exam  Vitals reviewed. Constitutional:       General: He is not in acute distress. Appearance: He is well-developed. He is not diaphoretic. HENT:      Head: Normocephalic and atraumatic. Right Ear: External ear normal.      Left Ear: External ear normal.      Nose: Nose normal.   Eyes:      General: No scleral icterus. Right eye: No discharge. Left eye: No discharge. Conjunctiva/sclera: Conjunctivae normal.   Cardiovascular:      Rate and Rhythm: Normal rate and regular rhythm. Heart sounds: Normal heart sounds. Pulmonary:      Effort: Pulmonary effort is normal. No respiratory distress. Breath sounds: Normal breath sounds. No wheezing or rales. Chest:      Chest wall: No tenderness. Abdominal:      General: Bowel sounds are normal. There is no distension. Palpations: Abdomen is soft. There is no mass. Tenderness: There is no abdominal tenderness. There is no guarding or rebound. Musculoskeletal:         General: No tenderness. Normal range of motion. Cervical back: Normal range of motion and neck supple. Skin:     General: Skin is warm and dry. Coloration: Skin is not pale. Findings: No erythema or rash.    Neurological:      Mental Status: He is alert and oriented to person, place, and time. Cranial Nerves: No cranial nerve deficit. Psychiatric:         Behavior: Behavior normal.         Thought Content: Thought content normal.         Judgment: Judgment normal.       Lab Results   Component Value Date    WBC 5.5 01/14/2022    HGB 17.0 01/14/2022    HCT 49.0 01/14/2022    MCV 90.4 01/14/2022     01/14/2022     Lab Results   Component Value Date     01/14/2022    K 4.4 01/14/2022     01/14/2022    CO2 22 (L) 01/14/2022     Lab Results   Component Value Date    CREATININE 0.8 01/14/2022     Lab Results   Component Value Date    ALT 47 01/14/2022    AST 31 01/14/2022    ALKPHOS 63 01/14/2022    BILITOT 0.6 01/14/2022     No results found for: LIPASE    Patient Active Problem List   Diagnosis    Morbid obesity with BMI of 50.0-59.9, adult ([facility] [address]    NATACHA on CPAP       An electronic signature was used to authenticate this note.     --Jax Gleason MD

## 2022-03-25 ENCOUNTER — TELEMEDICINE (OUTPATIENT)
Dept: BARIATRICS/WEIGHT MGMT | Age: 47
End: 2022-03-25

## 2022-03-25 DIAGNOSIS — E66.01 MORBID OBESITY WITH BMI OF 50.0-59.9, ADULT (HCC): Primary | ICD-10-CM

## 2022-03-25 DIAGNOSIS — G47.33 OBSTRUCTIVE SLEEP APNEA: Primary | ICD-10-CM

## 2022-03-25 DIAGNOSIS — Z01.818 PRE-OP TESTING: ICD-10-CM

## 2022-03-25 NOTE — PROGRESS NOTES
Assessment: Patient is a 42 y.o. male seen for month two  follow up MNT visit for  pre op bariatric surgery desires sleeve. This is a virtual visit. Last seen by RD in office January     Vitals from current and previous visits:    Vitals 9/36/1871   SYSTOLIC 720   DIASTOLIC 82   Site Right Upper Arm   Position Sitting   Cuff Size Large Adult   Pulse 66   Temp    Resp 18   SpO2    Weight 444 lb 6.4 oz   Height 6' 4\"   Body mass index 54.09 kg/m2   Waist (Inches)    Neck circumference (Inches)        Initial weight at start of Weight Management Program was: 441 lbs     Betsey Barber lost ~ 3 lbs from last office visit  -Weight goal: lose weight.      -Nutritionally relevant labs:  initial labs- Vit D-25, glucose-111, ferritin-335, iron-127, B1-167  Lab Results   Component Value Date/Time    LABA1C 5.7 01/14/2022 09:15 AM    LABA1C 5.4 08/26/2016 11:12 AM    LABA1C 5.8 03/11/2013 09:59 AM    GLUCOSE 111 (H) 01/14/2022 09:55 AM    GLUCOSE 110 (H) 08/26/2016 11:12 AM    CHOL 201 (H) 01/14/2022 09:55 AM    HDL 42 01/14/2022 09:55 AM    LDLCALC 137 01/14/2022 09:55 AM    TRIG 109 01/14/2022 09:55 AM                  Lab Results   Component Value Date    VITD25 25 01/14/2022     Has CPAP machine. 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 Breakfast:  May eat sausage, and eggs on low carb bread or oatmeal with monk fruit. Down to 16 oz coffee with half and half creamer instead of 32 oz coffee  Snack first break- 9am- apples or grapes or nuts.   Lunch: packs lunch for work- leftover fro [employer] before or pork chops, taco salad, brats  Dinner:cooks at home for dinner- broccoli, peas and a protein meat  Snacks:states doing better with evening snacking- overall states doing better with this  Main Beverages:Down to 16 oz coffee with half and half creamer instead of 32 oz coffee, water- 5 -6 bottles per day, gatorade zero, diet pop usually once a day- with lunch , occasional hot tea       -Impression of Dietary Intake: working on portion sizes and not skipping meals - Pt  is working towards avoiding high fat/high sugar foods. Pt  is working towards  including protein at meals and snacks. Exercise:  -Physical Activity is:  \"I am not getting as much as I should\"   Plans to walk or bike riding outside for at least ~ 20 minutes three days a week     Nutrition Diagnosis: Overweight/Obesity related  currently undergoing MNT as evidenced by BMI of 54    Intervention:   Healthy behaviors: adequate fluid intake  Patient has not attended support groups via You Tube yet. Pt requires to bring in food journal for a more thorough review of food choices and assist with weight loss efforts to next visit. Marquis Granados Pt willing to eliminate carbonated beverages and encouraged to try going down to one every other day. Goals reviewed with patient  Goals:  1. Nutrition Goal:  I will bring back my food journal to next office visit. Record meal times, serving sizes and bring back to next dietitian visit  2. Water Goal:  Great job with water intake- continue to drink at least 5 bottles or greater. Goal is no pop- try drinking one every other day to start weaning off of it  3. Exercise Goal:  I will begin walking outside for ~ 20 minutes at least three days a week  4. Can view online You Tube Videos as support groups. 5.  Can call Central Scheduling at [phone] anytime after next Wednesday to set up your PFT's appontment.   Once this is comlepted you can call Dr Yfn Chavez office and request a \"Pulmonary Evaluation/Clearance\" for bariatric surgery.       -Followup visit: 4 weeks with dietitian and MD.         Robert Campo, DOMENICO, LD,   Dietitian- Weight Management [address] [place] [address]

## 2022-03-25 NOTE — PATIENT INSTRUCTIONS
Goals: 1. Nutrition Goal:  I will bring back my food journal to next office visit. Record meal times, serving sizes and bring back to next dietitian visit  2. Water Goal:  Great job with water intake- continue to drink at least 5 bottles or greater. Goal is no pop- try drinking one every other day to start weaning off of it  3. Exercise Goal:  I will begin walking outside for ~ 20 minutes at least three days a week  4. Can view online You Tube Videos as support groups. 5.  Can call Central Scheduling at [phone] anytime after next Wednesday to set up your PFT's appontment. Once this is comlepted you can call Dr Nydia Leon office and request a \"Pulmonary Evaluation/Clearance\" for bariatric surgery.

## 2022-04-01 ENCOUNTER — OFFICE VISIT (OUTPATIENT)
Dept: PSYCHOLOGY | Age: 47
End: 2022-04-01
Payer: MEDICARE

## 2022-04-01 ENCOUNTER — PATIENT MESSAGE (OUTPATIENT)
Dept: FAMILY MEDICINE CLINIC | Age: 47
End: 2022-04-01

## 2022-04-01 ENCOUNTER — E-VISIT (OUTPATIENT)
Dept: OTHER | Facility: CLINIC | Age: 47
End: 2022-04-01
Payer: MEDICARE

## 2022-04-01 DIAGNOSIS — Z87.828 HISTORY OF TRAUMA: ICD-10-CM

## 2022-04-01 DIAGNOSIS — F41.1 GAD (GENERALIZED ANXIETY DISORDER): Primary | ICD-10-CM

## 2022-04-01 DIAGNOSIS — E66.01 MORBID OBESITY (HCC): ICD-10-CM

## 2022-04-01 DIAGNOSIS — F43.23 ADJUSTMENT DISORDER WITH MIXED ANXIETY AND DEPRESSED MOOD: Primary | ICD-10-CM

## 2022-04-01 DIAGNOSIS — F41.1 GAD (GENERALIZED ANXIETY DISORDER): ICD-10-CM

## 2022-04-01 PROCEDURE — 1036F TOBACCO NON-USER: CPT | Performed by: PSYCHOLOGIST

## 2022-04-01 PROCEDURE — 99441 PR PHYS/QHP TELEPHONE EVALUATION 5-10 MIN: CPT | Performed by: NURSE PRACTITIONER

## 2022-04-01 PROCEDURE — 90837 PSYTX W PT 60 MINUTES: CPT | Performed by: PSYCHOLOGIST

## 2022-04-01 RX ORDER — BUPROPION HYDROCHLORIDE 100 MG/1
100 TABLET, EXTENDED RELEASE ORAL 2 TIMES DAILY
Qty: 60 TABLET | Refills: 0 | Status: SHIPPED | OUTPATIENT
Start: 2022-04-01 | End: 2022-05-02 | Stop reason: SDUPTHER

## 2022-04-01 NOTE — TELEPHONE ENCOUNTER
From: Terell Baig  To: Charles Sow  Sent: 4/1/2022 9:39 AM EDT  Subject: Welbutrin     I have difficulty staying erect and having a sexual release due to the medicine. The psychiatrist recommended welbutrin to be taken as well as the lexipro. Can and should this be done? Are there better options?
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
## 2022-04-21 NOTE — PROGRESS NOTES
or sausage, egg cheese sandwich on low carb bread when working    [employer] first break- 9am- apples or grapes or nuts. Lunch: packs lunch for work- leftover from [employer] before - maybe two burgers for work and broccoli and cauliflower  Dinner:cooks at home for dinner-, occasionally eats out meals  Snacks:states doing better with evening snacking  Main Beverages:Down to 8-16 oz coffee at most  water- 5 -6 bottles per day, gatorade zero, diet pop once a week instead of daily-  occasional hot tea       -Impression of Dietary Intake: being mindful when eating and slowing down - Pt  is working towards avoiding high fat/high sugar foods. Pt  is working towards  including protein at meals and snacks. Exercise:  -Physical Activity is:  Overall physical activity has been decreased. Discussed with patient beginning to be more active to assist with weight loss efforts and preparation for bariatric surgery. New exercise goal set with patient. Nutrition Diagnosis: Overweight/Obesity related  currently undergoing MNT as evidenced by BMI of 54    Intervention:   Healthy behaviors: adequate fluid intake, less coffee and carbonated beverages  Patient has viewed one You Tube Online Video. Pt able to recognize areas for improvement in preparation for bariatric surgery. Agree with starting therapy. Goals reviewed and reinforcement given.       -Followup visit: 4 weeks with dietitian and MD after trauma therapy visit on 5/20/22         Star Chaney RD, JOSE ARMANDO,   Dietitian- Weight Management [address]

## 2022-04-22 ENCOUNTER — OFFICE VISIT (OUTPATIENT)
Dept: BARIATRICS/WEIGHT MGMT | Age: 47
End: 2022-04-22
Payer: MEDICARE

## 2022-04-22 ENCOUNTER — HOSPITAL ENCOUNTER (OUTPATIENT)
Dept: PULMONOLOGY | Age: 47
Discharge: HOME | End: 2022-04-22
Payer: MEDICARE

## 2022-04-22 ENCOUNTER — OFFICE VISIT (OUTPATIENT)
Dept: BARIATRICS/WEIGHT MGMT | Age: 47
End: 2022-04-22

## 2022-04-22 VITALS
RESPIRATION RATE: 18 BRPM | BODY MASS INDEX: 38.36 KG/M2 | HEART RATE: 60 BPM | DIASTOLIC BLOOD PRESSURE: 78 MMHG | WEIGHT: 315 LBS | HEIGHT: 76 IN | SYSTOLIC BLOOD PRESSURE: 126 MMHG

## 2022-04-22 DIAGNOSIS — Z01.818 PRE-OP TESTING: ICD-10-CM

## 2022-04-22 DIAGNOSIS — F41.9 ANXIETY: ICD-10-CM

## 2022-04-22 DIAGNOSIS — N20.0 NEPHROLITHIASIS: ICD-10-CM

## 2022-04-22 DIAGNOSIS — G47.33 OBSTRUCTIVE SLEEP APNEA: ICD-10-CM

## 2022-04-22 DIAGNOSIS — I10 HYPERTENSION, UNSPECIFIED TYPE: ICD-10-CM

## 2022-04-22 DIAGNOSIS — F32.A DEPRESSION, UNSPECIFIED DEPRESSION TYPE: ICD-10-CM

## 2022-04-22 DIAGNOSIS — E66.01 MORBID OBESITY WITH BMI OF 50.0-59.9, ADULT (HCC): Primary | ICD-10-CM

## 2022-04-22 DIAGNOSIS — E55.9 VITAMIN D DEFICIENCY: ICD-10-CM

## 2022-04-22 PROCEDURE — G8417 CALC BMI ABV UP PARAM F/U: HCPCS | Performed by: SURGERY

## 2022-04-22 PROCEDURE — G8427 DOCREV CUR MEDS BY ELIG CLIN: HCPCS | Performed by: SURGERY

## 2022-04-22 PROCEDURE — 94726 PLETHYSMOGRAPHY LUNG VOLUMES: CPT

## 2022-04-22 PROCEDURE — 94010 BREATHING CAPACITY TEST: CPT

## 2022-04-22 PROCEDURE — 99213 OFFICE O/P EST LOW 20 MIN: CPT | Performed by: SURGERY

## 2022-04-22 PROCEDURE — 1036F TOBACCO NON-USER: CPT | Performed by: SURGERY

## 2022-04-22 PROCEDURE — 94729 DIFFUSING CAPACITY: CPT

## 2022-04-23 ASSESSMENT — ENCOUNTER SYMPTOMS
APNEA: 0
NAUSEA: 0
ALLERGIC/IMMUNOLOGIC NEGATIVE: 1
VOMITING: 0
EYE DISCHARGE: 0
SHORTNESS OF BREATH: 0
TROUBLE SWALLOWING: 0
EYE REDNESS: 0
RECTAL PAIN: 0
CHEST TIGHTNESS: 0
FACIAL SWELLING: 0
SINUS PRESSURE: 0
CHOKING: 0
CONSTIPATION: 0
DIARRHEA: 0
BLOOD IN STOOL: 0
COLOR CHANGE: 0
COUGH: 0
ABDOMINAL PAIN: 0
SORE THROAT: 0
PHOTOPHOBIA: 0
ABDOMINAL DISTENTION: 0
EYE PAIN: 0
ANAL BLEEDING: 0
STRIDOR: 0
EYE ITCHING: 0
VOICE CHANGE: 0
BACK PAIN: 0
RHINORRHEA: 0
WHEEZING: 0

## 2022-04-23 NOTE — PROGRESS NOTES
Reji Upton (:  1975)     ASSESSMENT:  1. Morbid obesity (BMI 54)  2. Anxiety  3. Depression  4. Nephrolithiasis  5. Hypertension  6.  Depression  7.  Sleep apnea  8.  Vitamin D deficiency    PLAN:  1. Discussion again about the pros and cons of weight loss surgery.  The risks benefits and alternatives to laparoscopic adjustable band, gastric sleeve and gastric Camacho-en-Y bypass were discussed in detail.  The pros and cons of robotic assisted, laparoscopic and open techniques were discussed. 2.  Behavior modification discussed again in regards to dietary habits. 3.  Nutritional education occurred during visit.  Follow-up with dietitian for further evaluation.  Follow recommendations as directed. 4.  Options for medical management of morbid obesity again discussed. 5.  Improvement in fitness/exercise discussed with patient and the need for this with/without surgery. 6.  Medical necessity letter from PCP. 7.  Follow-up in one month at weight management program at [address] [place] [address]. 8.  Signs and symptoms reviewed with patient that would be concerning and need him to return to office for re-evaluation. Patient states he will call if he has questions or concerns.   9. Multivitamin  10.  Psychology evaluation in process  11.  EGD prior to any surgical intervention  12.  Encouraged support groups  13.  Discussed the pros and cons along with possible side effects/complications of weight loss medications.  All questions answered. 14.  Following up with pulmonary service     --Patient states that if he is not able to lose enough adequate excess body weight with medical management only then he would be like to proceed with surgical intervention such as sleeve gastrectomy/gastric bypass for further weight loss.     --More than 20 minutes spent with patient today[facility] time was involved counseling, educaton and coordinating care.     SUBJECTIVE/OBJECTIVE:    Chief Complaint Patient presents with    Follow-up     month 3 of 6; desires sleeve      HPI  Sonam Fairbanks is a 68-year-old male who presents for follow-up at the weight management program secondary to his morbid obesity. BMI 54. Current weight 448 pounds. He has gained 1 pound since last visit. Admits that he had a rough month from an emotional standpoint. His teenage daughter got expelled from school. He has still been working with dietitian and continuing to work on portion control and food selection. Following up with psychology. Awaiting for upper endoscopy. Taking multivitamin. Trying to become more active. Following up with pulmonary service. Denies current chest or abdominal pain. No hematochezia or melena. No new urinary complaints. He states he is excited that the weather is becoming nicer so that he can get outside and hopefully become more active. Admits he is still changing up some of his anxiety medications with his PCP in hopes to keep him a little more stable but yet not fatigued. Still wishing to proceed with surgical intervention if he is not able to lose enough adequate excess body weight with medical management only. Review of Systems   Constitutional: Negative for activity change, appetite change, chills, diaphoresis, fatigue, fever and unexpected weight change. HENT: Negative for congestion, dental problem, drooling, ear discharge, ear pain, facial swelling, hearing loss, mouth sores, nosebleeds, postnasal drip, rhinorrhea, sinus pressure, sneezing, sore throat, tinnitus, trouble swallowing and voice change. Eyes: Negative for photophobia, pain, discharge, redness, itching and visual disturbance. Respiratory: Negative for apnea, cough, choking, chest tightness, shortness of breath, wheezing and stridor. Cardiovascular: Negative for chest pain, palpitations and leg swelling.    Gastrointestinal: Negative for abdominal distention, abdominal pain, anal bleeding, blood in stool, constipation, diarrhea, nausea, rectal pain and vomiting. Endocrine: Negative. Genitourinary: Negative for decreased urine volume, difficulty urinating, dysuria, enuresis, flank pain, frequency, genital sores, hematuria, penile discharge, penile pain, penile swelling, scrotal swelling, testicular pain and urgency. Musculoskeletal: Negative for arthralgias, back pain, gait problem, joint swelling, myalgias, neck pain and neck stiffness. Skin: Negative for color change, pallor, rash and wound. Allergic/Immunologic: Negative. Neurological: Negative for dizziness, tremors, seizures, syncope, facial asymmetry, speech difficulty, weakness, light-headedness, numbness and headaches. Hematological: Negative for adenopathy. Does not bruise/bleed easily. Psychiatric/Behavioral: Negative for agitation, behavioral problems, confusion, decreased concentration, dysphoric mood, hallucinations, self-injury, sleep disturbance and suicidal ideas. The patient is nervous/anxious. The patient is not hyperactive.         Past Medical History:   Diagnosis Date    Anxiety     Depression     Hypertension     Kidney stone        Past Surgical History:   Procedure Laterality Date    CYSTOSCOPY Left 3/14/13    cystoscopy, left ureteroscopy, laser lithotripsy, left stent insertion       Current Outpatient Medications   Medication Sig Dispense Refill    buPROPion (WELLBUTRIN SR) 100 MG extended release tablet Take 1 tablet by mouth 2 times daily 60 tablet 0    loratadine (CLARITIN) 10 MG tablet Take 10 mg by mouth daily      Cholecalciferol (VITAMIN D3) 1.25 MG (20578 UT) CAPS Take 1 capsule by mouth once a week 4 capsule 2    escitalopram (LEXAPRO) 10 MG tablet Take 1 tablet by mouth daily 90 tablet 3    Ascorbic Acid (VITAMIN C PO) Take by mouth daily      MAGNESIUM PO Take by mouth daily      Multiple Vitamin (MULTI-VITAMIN PO) Take by mouth daily      fluticasone (FLONASE) 50 MCG/ACT nasal spray instill 2 sprays into each nostril once daily 48 g 3     No current facility-administered medications for this visit. No Known Allergies    Family History   Problem Relation Age of Onset   [facility] Cancer Mother         thyroid    High Blood Pressure Mother     Diabetes Maternal Grandmother     Obesity Maternal Grandmother     Heart Disease Maternal Grandfather     Diabetes Paternal Grandmother     Obesity Paternal Grandmother     Heart Disease Paternal Grandfather        Social History     Socioeconomic History    Marital status:      Spouse name: Paulette Reese Number of children: 11    Years of education: 15    Highest education level: High school graduate   Occupational History    Not on file   Tobacco Use    Smoking status: Former Smoker     Types: Cigarettes     Quit date: 3/5/2000     Years since quittin.1    Smokeless tobacco: Former User     Types: Snuff     Quit date: 3/5/2000   Vaping Use    Vaping Use: Never used   Substance and Sexual Activity    Alcohol use: No    Drug use: Not Currently     Comment: many years ago marijuana, cocaine    Sexual activity: Not on file   Other Topics Concern    Not on file   Social History Narrative    Not on file     Social Determinants of Health     Financial Resource Strain:     Difficulty of Paying Living Expenses: Not on file   Food Insecurity:     Worried About [address] in the Last Year: Not on file    [address] in the Last Year: Not on file   Transportation Needs:     Lack of Transportation (Medical): Not on file    Lack of Transportation (Non-Medical):  Not on file   Physical Activity:     Days of Exercise per Week: Not on file    Minutes of Exercise per Session: Not on file   Stress:     Feeling of Stress : Not on file   Social Connections:     Frequency of Communication with Friends and Family: Not on file    Frequency of Social Gatherings with Friends and Family: Not on file    Attends Mu-ism Services: Not on file   [facility] Active Member of Clubs or Organizations: Not on file    Attends Club or Organization Meetings: Not on file    Marital Status: Not on file   Intimate Partner Violence:     Fear of Current or Ex-Partner: Not on file    Emotionally Abused: Not on file    Physically Abused: Not on file    Sexually Abused: Not on file   Housing Stability:     Unable to Pay for Housing in the Last Year: Not on file    Number of [place] in the Last Year: Not on file    Unstable Housing in the Last Year: Not on file     Vitals:    04/22/22 0930   BP: 126/78   Site: Right Upper Arm   Position: Sitting   Cuff Size: Large Adult   Pulse: 60   Resp: 18   Weight: (!) 448 lb 3.2 oz (203.3 kg)   Height: 6' 4\" (1.93 m)     Body mass index is 54.56 kg/m². Wt Readings from Last 3 Encounters:   04/22/22 (!) 448 lb 3.2 oz (203.3 kg)   03/11/22 (!) 444 lb 6.4 oz (201.6 kg)   01/21/22 (!) 447 lb (202.8 kg)     Physical Exam  Vitals reviewed. Constitutional:       General: He is not in acute distress. Appearance: He is well-developed. He is not diaphoretic. HENT:      Head: Normocephalic and atraumatic. Right Ear: External ear normal.      Left Ear: External ear normal.      Nose: Nose normal.   Eyes:      General: No scleral icterus. Right eye: No discharge. Left eye: No discharge. Conjunctiva/sclera: Conjunctivae normal.   Cardiovascular:      Rate and Rhythm: Normal rate and regular rhythm. Heart sounds: Normal heart sounds. Pulmonary:      Effort: Pulmonary effort is normal. No respiratory distress. Breath sounds: Normal breath sounds. No wheezing or rales. Chest:      Chest wall: No tenderness. Abdominal:      General: Bowel sounds are normal. There is no distension. Palpations: Abdomen is soft. There is no mass. Tenderness: There is no abdominal tenderness. There is no guarding or rebound. Musculoskeletal:         General: No tenderness. Normal range of motion.       Cervical back: Normal range of motion and neck supple. Skin:     General: Skin is warm and dry. Coloration: Skin is not pale. Findings: No erythema or rash. Neurological:      Mental Status: He is alert and oriented to person, place, and time. Cranial Nerves: No cranial nerve deficit. Psychiatric:         Behavior: Behavior normal.         Thought Content: Thought content normal.         Judgment: Judgment normal.       Lab Results   Component Value Date    WBC 5.5 01/14/2022    HGB 17.0 01/14/2022    HCT 49.0 01/14/2022    MCV 90.4 01/14/2022     01/14/2022     Lab Results   Component Value Date     01/14/2022    K 4.4 01/14/2022     01/14/2022    CO2 22 (L) 01/14/2022     Lab Results   Component Value Date    CREATININE 0.8 01/14/2022     Lab Results   Component Value Date    ALT 47 01/14/2022    AST 31 01/14/2022    ALKPHOS 63 01/14/2022    BILITOT 0.6 01/14/2022     No results found for: LIPASE    Patient Active Problem List   Diagnosis    Morbid obesity with BMI of 50.0-59.9, adult ([facility] [address]    NATACHA on CPAP       An electronic signature was used to authenticate this note.     --Ashwini Flood MD

## 2022-05-02 DIAGNOSIS — F41.1 GAD (GENERALIZED ANXIETY DISORDER): ICD-10-CM

## 2022-05-02 RX ORDER — BUPROPION HYDROCHLORIDE 100 MG/1
100 TABLET, EXTENDED RELEASE ORAL 2 TIMES DAILY
Qty: 180 TABLET | Refills: 3 | Status: SHIPPED | OUTPATIENT
Start: 2022-05-02

## 2022-05-02 ASSESSMENT — PATIENT HEALTH QUESTIONNAIRE - PHQ9
5. POOR APPETITE OR OVEREATING: 1
2. FEELING DOWN, DEPRESSED OR HOPELESS: 0
7. TROUBLE CONCENTRATING ON THINGS, SUCH AS READING THE NEWSPAPER OR WATCHING TELEVISION: NOT AT ALL
SUM OF ALL RESPONSES TO PHQ QUESTIONS 1-9: 5
8. MOVING OR SPEAKING SO SLOWLY THAT OTHER PEOPLE COULD HAVE NOTICED. OR THE OPPOSITE - BEING SO FIDGETY OR RESTLESS THAT YOU HAVE BEEN MOVING AROUND A LOT MORE THAN USUAL: NOT AT ALL
4. FEELING TIRED OR HAVING LITTLE ENERGY: MORE THAN HALF THE DAYS
6. FEELING BAD ABOUT YOURSELF - OR THAT YOU ARE A FAILURE OR HAVE LET YOURSELF OR YOUR FAMILY DOWN: NOT AT ALL
7. TROUBLE CONCENTRATING ON THINGS, SUCH AS READING THE NEWSPAPER OR WATCHING TELEVISION: 0
6. FEELING BAD ABOUT YOURSELF - OR THAT YOU ARE A FAILURE OR HAVE LET YOURSELF OR YOUR FAMILY DOWN: 0
4. FEELING TIRED OR HAVING LITTLE ENERGY: 2
9. THOUGHTS THAT YOU WOULD BE BETTER OFF DEAD, OR OF HURTING YOURSELF: NOT AT ALL
SUM OF ALL RESPONSES TO PHQ QUESTIONS 1-9: 5
1. LITTLE INTEREST OR PLEASURE IN DOING THINGS: 2
9. THOUGHTS THAT YOU WOULD BE BETTER OFF DEAD, OR OF HURTING YOURSELF: 0
10. IF YOU CHECKED OFF ANY PROBLEMS, HOW DIFFICULT HAVE THESE PROBLEMS MADE IT FOR YOU TO DO YOUR WORK, TAKE CARE OF THINGS AT HOME, OR GET ALONG WITH OTHER PEOPLE: NOT DIFFICULT AT ALL
8. MOVING OR SPEAKING SO SLOWLY THAT OTHER PEOPLE COULD HAVE NOTICED. OR THE OPPOSITE, BEING SO FIGETY OR RESTLESS THAT YOU HAVE BEEN MOVING AROUND A LOT MORE THAN USUAL: 0
2. FEELING DOWN, DEPRESSED OR HOPELESS: NOT AT ALL
SUM OF ALL RESPONSES TO PHQ9 QUESTIONS 1 & 2: 2
SUM OF ALL RESPONSES TO PHQ QUESTIONS 1-9: 5
SUM OF ALL RESPONSES TO PHQ QUESTIONS 1-9: 5
3. TROUBLE FALLING OR STAYING ASLEEP: NOT AT ALL
SUM OF ALL RESPONSES TO PHQ QUESTIONS 1-9: 5
10. IF YOU CHECKED OFF ANY PROBLEMS, HOW DIFFICULT HAVE THESE PROBLEMS MADE IT FOR YOU TO DO YOUR WORK, TAKE CARE OF THINGS AT HOME, OR GET ALONG WITH OTHER PEOPLE: 0
3. TROUBLE FALLING OR STAYING ASLEEP: 0
1. LITTLE INTEREST OR PLEASURE IN DOING THINGS: MORE THAN HALF THE DAYS
5. POOR APPETITE OR OVEREATING: SEVERAL DAYS

## 2022-05-02 ASSESSMENT — PATIENT HEALTH QUESTIONNAIRE - GENERAL
HAVE YOU BEEN EXPERIENCING LIGHT HEADEDNESS, WOOZINESS, OR DIZZINESS, ESPECIALLY UPON STANDING FROM SITTING OR LYING POSITIONS: N
SINCE YOUR LAST VISIT, HAVE YOU EXPERIENCED EPISODES OF FAINTING OR NEAR FAINTING: N
HAVE YOU BEEN EXPERIENCING NEW OR WORSENING DIFFICULTY WITH URINATION OR CHANGE IN URINARY PATTERN: N
DO YOU HAVE ANY NEW RASHES OR UNEXPLAINED ITCHING OR SWELLING: N
HAVE YOU BEEN EXPERIENCING NEW OR WORSENING PROBLEMS WITH YOUR SEXUAL FUNCTION: N
HAVE YOU BEEN EXPERIENCING AN UNUSUALLY DRY MOUTH: Y
HAVE YOU BEEN EXPERIENCING HALLUCINATIONS OR CONFUSION: N
HAVE YOU BEEN EXPERIENCING NEW OR WORSENING HEADACHES: N
HAVE YOU BEEN EXPERIENCING UNEXPLAINED HIGH FEVERS OR EXCESSIVE SWEATING: N
HAVE YOU BEEN EXPERIENCING ABDOMINAL DISCOMFORT, NAUSEA OR CHANGES IN YOUR BOWEL PATTERN: N
HAVE YOU BEEN EXPERIENCING RACING THOUGHTS OR IMPULSIVE BEHAVIOR: N
DO YOU HAVE A FASTER HEART RATE THAN USUAL, DOES YOUR HEART RATE FEEL IRREGULAR OR DO YOU FEEL LIKE YOUR HEART IS POUNDING AT REST: N
HAVE YOU BEEN EXPERIENCING INVOLUNTARY WEIGHT GAIN OR LOSS: N
HAVE YOU BEEN EXPERIENCING NEW OR WORSENING VISUAL CHANGES: N
HAVE YOU BEEN EXPERIENCING VIVID DREAMS OR NIGHTMARES THAT INTERFERE WITH YOUR SLEEP: N
HAVE YOU BEEN EXPERIENCING NEW OR WORSENING MUSCLE TWITCHING, SHAKINESS, OR OTHER UNUSUAL CHANGES IN YOUR MUSCLE MOVEMENT: N
HAVE YOU BEEN EXPERIENCING VERY LOW OR VERY HIGH MOODS: N

## 2022-05-02 ASSESSMENT — ANXIETY QUESTIONNAIRES
4. TROUBLE RELAXING: 0-NOT AT ALL
2. NOT BEING ABLE TO STOP OR CONTROL WORRYING: 0-NOT AT ALL
1. FEELING NERVOUS, ANXIOUS, OR ON EDGE: NOT AT ALL
5. BEING SO RESTLESS THAT IT IS HARD TO SIT STILL: NOT AT ALL
2. NOT BEING ABLE TO STOP OR CONTROL WORRYING: NOT AT ALL
3. WORRYING TOO MUCH ABOUT DIFFERENT THINGS: 0-NOT AT ALL
5. BEING SO RESTLESS THAT IT IS HARD TO SIT STILL: 0-NOT AT ALL
3. WORRYING TOO MUCH ABOUT DIFFERENT THINGS: NOT AT ALL
6. BECOMING EASILY ANNOYED OR IRRITABLE: 0-NOT AT ALL
7. FEELING AFRAID AS IF SOMETHING AWFUL MIGHT HAPPEN: NOT AT ALL
1. FEELING NERVOUS, ANXIOUS, OR ON EDGE: 0
GAD7 TOTAL SCORE: 0
GAD7 TOTAL SCORE: 0
4. TROUBLE RELAXING: NOT AT ALL
7. FEELING AFRAID AS IF SOMETHING AWFUL MIGHT HAPPEN: 0-NOT AT ALL
6. BECOMING EASILY ANNOYED OR IRRITABLE: NOT AT ALL

## 2022-05-02 NOTE — PROGRESS NOTES
HPI: see questionnaire  Objective: NA     Assessment/Plan        Diagnosis Orders   1. JOE (generalized anxiety disorder)          MDM:  Mood doing well. Refill sent. Following with Therapist.         5-10 minutes were spent on the digital evaluation and management of this patient.         Encouraged to follow up with your PCP if not better

## 2022-05-18 NOTE — PROGRESS NOTES
Assessment: Patient is a 42 y.o. male seen for month four  follow up MNT visit for  pre op bariatric surgery desires sleeve. Vitals from current and previous visits:      Vitals 7/69/3432   SYSTOLIC 857   DIASTOLIC 84   Site Right Upper Arm   Position Sitting   Cuff Size Large Adult   Pulse 76   Temp 97.3   Resp 18   SpO2    Weight 441 lb 6.4 oz   Height 6' 4\"   Body mass index 53.72 kg/m2   Waist (Inches)    Neck circumference (Inches)        Initial weight at start of Weight Management Program was: 441 lbs     Vinay Fan  Lost 7  lbs from last month  -Weight goal: lose weight.      -Nutritionally relevant labs:  initial labs- Vit D-25, glucose-111, ferritin-335, iron-127, B1-167  Lab Results   Component Value Date/Time    LABA1C 5.7 01/14/2022 09:15 AM    LABA1C 5.4 08/26/2016 11:12 AM    LABA1C 5.8 03/11/2013 09:59 AM    GLUCOSE 111 (H) 01/14/2022 09:55 AM    GLUCOSE 110 (H) 08/26/2016 11:12 AM    CHOL 201 (H) 01/14/2022 09:55 AM    HDL 42 01/14/2022 09:55 AM    LDLCALC 137 01/14/2022 09:55 AM    TRIG 109 01/14/2022 09:55 AM                  Lab Results   Component Value Date    VITD25 25 01/14/2022     Has CPAP machine. 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   Usually has a vegetable with dinner- green beans,

## 2022-05-20 ENCOUNTER — OFFICE VISIT (OUTPATIENT)
Dept: BARIATRICS/WEIGHT MGMT | Age: 47
End: 2022-05-20
Payer: MEDICARE

## 2022-05-20 ENCOUNTER — OFFICE VISIT (OUTPATIENT)
Dept: BARIATRICS/WEIGHT MGMT | Age: 47
End: 2022-05-20

## 2022-05-20 ENCOUNTER — OFFICE VISIT (OUTPATIENT)
Dept: PSYCHOLOGY | Age: 47
End: 2022-05-20
Payer: MEDICARE

## 2022-05-20 VITALS
TEMPERATURE: 97.3 F | HEIGHT: 76 IN | RESPIRATION RATE: 18 BRPM | HEART RATE: 76 BPM | SYSTOLIC BLOOD PRESSURE: 126 MMHG | DIASTOLIC BLOOD PRESSURE: 84 MMHG | BODY MASS INDEX: 38.36 KG/M2 | WEIGHT: 315 LBS

## 2022-05-20 DIAGNOSIS — E66.01 MORBID OBESITY (HCC): ICD-10-CM

## 2022-05-20 DIAGNOSIS — F32.A DEPRESSION, UNSPECIFIED DEPRESSION TYPE: ICD-10-CM

## 2022-05-20 DIAGNOSIS — F41.9 ANXIETY: ICD-10-CM

## 2022-05-20 DIAGNOSIS — G47.33 OBSTRUCTIVE SLEEP APNEA: ICD-10-CM

## 2022-05-20 DIAGNOSIS — N20.0 NEPHROLITHIASIS: ICD-10-CM

## 2022-05-20 DIAGNOSIS — E66.01 MORBID OBESITY WITH BMI OF 50.0-59.9, ADULT (HCC): Primary | ICD-10-CM

## 2022-05-20 DIAGNOSIS — F43.23 ADJUSTMENT DISORDER WITH MIXED ANXIETY AND DEPRESSED MOOD: Primary | ICD-10-CM

## 2022-05-20 DIAGNOSIS — E55.9 VITAMIN D DEFICIENCY: ICD-10-CM

## 2022-05-20 DIAGNOSIS — I10 HYPERTENSION, UNSPECIFIED TYPE: ICD-10-CM

## 2022-05-20 DIAGNOSIS — Z87.828 HISTORY OF TRAUMA: ICD-10-CM

## 2022-05-20 PROCEDURE — G8427 DOCREV CUR MEDS BY ELIG CLIN: HCPCS | Performed by: SURGERY

## 2022-05-20 PROCEDURE — G8417 CALC BMI ABV UP PARAM F/U: HCPCS | Performed by: SURGERY

## 2022-05-20 PROCEDURE — 99213 OFFICE O/P EST LOW 20 MIN: CPT | Performed by: SURGERY

## 2022-05-20 PROCEDURE — 1036F TOBACCO NON-USER: CPT | Performed by: PSYCHOLOGIST

## 2022-05-20 PROCEDURE — 90834 PSYTX W PT 45 MINUTES: CPT | Performed by: PSYCHOLOGIST

## 2022-05-20 PROCEDURE — 1036F TOBACCO NON-USER: CPT | Performed by: SURGERY

## 2022-05-20 RX ORDER — IBUPROFEN 200 MG
200 TABLET ORAL EVERY 6 HOURS PRN
COMMUNITY

## 2022-05-20 NOTE — PATIENT INSTRUCTIONS
Goals: 1. Nutrition Goal:  Aim for regular meal times and do not skip meals  2. Water Goal:  Great job with water intake- continue to drink at least 64 oz of water or greater per day  Goal is no pop-!  3. Exercise Goal:  Continue to walk outside for ~ 20 minutes at least four days a week-   4  Watch online You Tube Videos as support groups.

## 2022-05-21 ASSESSMENT — ENCOUNTER SYMPTOMS
PHOTOPHOBIA: 0
VOMITING: 0
DIARRHEA: 0
APNEA: 0
SINUS PRESSURE: 0
SHORTNESS OF BREATH: 0
COLOR CHANGE: 0
VOICE CHANGE: 0
CHEST TIGHTNESS: 0
NAUSEA: 0
EYE REDNESS: 0
WHEEZING: 0
ABDOMINAL DISTENTION: 0
ANAL BLEEDING: 0
COUGH: 0
CHOKING: 0
SORE THROAT: 0
ALLERGIC/IMMUNOLOGIC NEGATIVE: 1
FACIAL SWELLING: 0
BACK PAIN: 0
RHINORRHEA: 0
EYE ITCHING: 0
BLOOD IN STOOL: 0
EYE DISCHARGE: 0
ABDOMINAL PAIN: 0
EYE PAIN: 0
TROUBLE SWALLOWING: 0
CONSTIPATION: 0
STRIDOR: 0
RECTAL PAIN: 0

## 2022-05-21 NOTE — PROGRESS NOTES
Tirso York (:  1975)     ASSESSMENT:  1. Morbid obesity (BMI 53)  2. Anxiety  3. Depression  4. Nephrolithiasis  5. Hypertension  6.  Depression  7.  Sleep apnea  8.  Vitamin D deficiency    PLAN:  1. Discussion again about the pros and cons of weight loss surgery.  The risks benefits and alternatives to laparoscopic adjustable band, gastric sleeve and gastric Camacho-en-Y bypass were discussed in detail.  The pros and cons of robotic assisted, laparoscopic and open techniques were discussed. 2.  Behavior modification discussed again in regards to dietary habits. 3.  Nutritional education occurred during visit.  Follow-up with dietitian for further evaluation.  Follow recommendations as directed. 4.  Options for medical management of morbid obesity again discussed. 5.  Improvement in fitness/exercise discussed with patient and the need for this with/without surgery. 6.  Medical necessity letter from PCP. 7.  Follow-up in one month at weight management program at [address] [place] [address]. 8.  Signs and symptoms reviewed with patient that would be concerning and need him to return to office for re-evaluation. Patient states he will call if he has questions or concerns.   9. Multivitamin  10.  Psychology evaluation in process  11.  EGD prior to any surgical intervention  12.  Encouraged continued support groups participation  13.  Discussed the pros and cons along with possible side effects/complications of weight loss medications.  All questions answered.   14.  Following up with pulmonary service as directed    --Patient states that if he is not able to lose enough adequate excess body weight with medical management only then he would be like to proceed with surgical intervention such as sleeve gastrectomy/gastric bypass for further weight loss.     --More than 20 minutes spent with patient today. Josh Alexander than 50% of the time was involved counseling, educaton and coordinating care.    SUBJECTIVE/OBJECTIVE:    Chief Complaint   Patient presents with    Follow-up     month 4 of 6; desires radha YEE  Denise Lara is a 51-year-old male who presents for follow-up at the weight management program secondary to his morbid obesity. BMI 53. Current weight 441 pounds. He has lost about 7 pounds since last month. Following with the dietitian. Continuing to work on portion control and food selection. Completed psychology clearance however he is continuing to work with psychology secondary to anxiety. Taking multivitamin. Vitamin D supplementation. PFTs recently done. Has a CPAP machine. Following with pulmonary service. Continuing to work on exercise. Taking more bike rides and doing more walking outside. Denies current chest or abdominal pain. No hematochezia or melena. No new urinary complaints. He continues to work towards his weight loss goals but admits that if he is not able to reach them he would like to proceed with surgical intervention such as a sleeve gastrectomy. Review of Systems   Constitutional: Negative for activity change, appetite change, chills, diaphoresis, fatigue, fever and unexpected weight change. HENT: Negative for congestion, dental problem, drooling, ear discharge, ear pain, facial swelling, hearing loss, mouth sores, nosebleeds, postnasal drip, rhinorrhea, sinus pressure, sneezing, sore throat, tinnitus, trouble swallowing and voice change. Eyes: Negative for photophobia, pain, discharge, redness, itching and visual disturbance. Respiratory: Negative for apnea, cough, choking, chest tightness, shortness of breath, wheezing and stridor. Cardiovascular: Negative for chest pain, palpitations and leg swelling. Gastrointestinal: Negative for abdominal distention, abdominal pain, anal bleeding, blood in stool, constipation, diarrhea, nausea, rectal pain and vomiting. Endocrine: Negative.     Genitourinary: Negative for decreased urine volume, difficulty urinating, dysuria, enuresis, flank pain, frequency, genital sores, hematuria, penile discharge, penile pain, penile swelling, scrotal swelling, testicular pain and urgency. Musculoskeletal: Negative for arthralgias, back pain, gait problem, joint swelling, myalgias, neck pain and neck stiffness. Skin: Negative for color change, pallor, rash and wound. Allergic/Immunologic: Negative. Neurological: Negative for dizziness, tremors, seizures, syncope, facial asymmetry, speech difficulty, weakness, light-headedness, numbness and headaches. Hematological: Negative for adenopathy. Does not bruise/bleed easily. Psychiatric/Behavioral: Negative for agitation, behavioral problems, confusion, decreased concentration, dysphoric mood, hallucinations, self-injury, sleep disturbance and suicidal ideas. The patient is nervous/anxious. The patient is not hyperactive.         Past Medical History:   Diagnosis Date    Anxiety     Depression     Hypertension     Kidney stone        Past Surgical History:   Procedure Laterality Date    CYSTOSCOPY Left 3/14/13    cystoscopy, left ureteroscopy, laser lithotripsy, left stent insertion       Current Outpatient Medications   Medication Sig Dispense Refill    ibuprofen (ADVIL;MOTRIN) 200 MG tablet Take 200 mg by mouth every 6 hours as needed for Pain      buPROPion (WELLBUTRIN SR) 100 MG extended release tablet Take 1 tablet by mouth 2 times daily 180 tablet 3    loratadine (CLARITIN) 10 MG tablet Take 10 mg by mouth daily      MAGNESIUM PO Take by mouth daily      Multiple Vitamin (MULTI-VITAMIN PO) Take by mouth daily      fluticasone (FLONASE) 50 MCG/ACT nasal spray instill 2 sprays into each nostril once daily 48 g 3    escitalopram (LEXAPRO) 10 MG tablet Take 1 tablet by mouth daily 90 tablet 3    Ascorbic Acid (VITAMIN C PO) Take by mouth daily (Patient not taking: Reported on 5/20/2022)       No current facility-administered medications for this visit. No Known Allergies    Family History   Problem Relation Age of Onset   Norberto Avalos Cancer Mother         thyroid    High Blood Pressure Mother     Diabetes Maternal Grandmother     Obesity Maternal Grandmother     Heart Disease Maternal Grandfather     Diabetes Paternal Grandmother     Obesity Paternal Grandmother     Heart Disease Paternal Grandfather        Social History     Socioeconomic History    Marital status:      Spouse name: Franky Salinas Number of children: 11    Years of education: 15    Highest education level: High school graduate   Occupational History    Not on file   Tobacco Use    Smoking status: Former Smoker     Types: Cigarettes     Quit date: 3/5/2000     Years since quittin.2    Smokeless tobacco: Former User     Types: Snuff     Quit date: 3/5/2000   Vaping Use    Vaping Use: Never used   Substance and Sexual Activity    Alcohol use: No    Drug use: Not Currently     Comment: many years ago marijuana, cocaine    Sexual activity: Not on file   Other Topics Concern    Not on file   Social History Narrative    Not on file     Social Determinants of Health     Financial Resource Strain:     Difficulty of Paying Living Expenses: Not on file   Food Insecurity:     Worried About [address] in the Last Year: Not on file    [address] in the Last Year: Not on file   Transportation Needs:     Lack of Transportation (Medical): Not on file    Lack of Transportation (Non-Medical):  Not on file   Physical Activity:     Days of Exercise per Week: Not on file    Minutes of Exercise per Session: Not on file   Stress:     Feeling of Stress : Not on file   Social Connections:     Frequency of Communication with Friends and Family: Not on file    Frequency of Social Gatherings with Friends and Family: Not on file    Attends Latter day Services: Not on file    Active Member of Clubs or Organizations: Not on file    Attends Club or Organization Meetings: Not on file    Marital Status: Not on file   Intimate Partner Violence:     Fear of Current or Ex-Partner: Not on file    Emotionally Abused: Not on file    Physically Abused: Not on file    Sexually Abused: Not on file   Housing Stability:     Unable to Pay for Housing in the Last Year: Not on file    Number of [place] in the Last Year: Not on file    Unstable Housing in the Last Year: Not on file     Vitals:    05/20/22 0948   BP: 126/84   Site: Right Upper Arm   Position: Sitting   Cuff Size: Large Adult   Pulse: 76   Resp: 18   Temp: 97.3 °F (36.3 °C)   TempSrc: Infrared   Weight: (!) 441 lb 6.4 oz (200.2 kg)   Height: 6' 4\" (1.93 m)     Body mass index is 53.73 kg/m². Wt Readings from Last 3 Encounters:   05/20/22 (!) 441 lb 6.4 oz (200.2 kg)   04/22/22 (!) 448 lb 3.2 oz (203.3 kg)   03/11/22 (!) 444 lb 6.4 oz (201.6 kg)     Physical Exam  Vitals reviewed. Constitutional:       General: He is not in acute distress. Appearance: He is well-developed. He is not diaphoretic. HENT:      Head: Normocephalic and atraumatic. Right Ear: External ear normal.      Left Ear: External ear normal.      Nose: Nose normal.   Eyes:      General: No scleral icterus. Right eye: No discharge. Left eye: No discharge. Conjunctiva/sclera: Conjunctivae normal.   Cardiovascular:      Rate and Rhythm: Normal rate and regular rhythm. Heart sounds: Normal heart sounds. Pulmonary:      Effort: Pulmonary effort is normal. No respiratory distress. Breath sounds: Normal breath sounds. No wheezing or rales. Chest:      Chest wall: No tenderness. Abdominal:      General: Bowel sounds are normal. There is no distension. Palpations: Abdomen is soft. There is no mass. Tenderness: There is no abdominal tenderness. There is no guarding or rebound. Musculoskeletal:         General: No tenderness. Normal range of motion. Cervical back: Normal range of motion and neck supple. Skin:     General: Skin is warm and dry. Coloration: Skin is not pale. Findings: No erythema or rash. Neurological:      Mental Status: He is alert and oriented to person, place, and time. Cranial Nerves: No cranial nerve deficit. Psychiatric:         Behavior: Behavior normal.         Thought Content: Thought content normal.         Judgment: Judgment normal.       Lab Results   Component Value Date    WBC 5.5 01/14/2022    HGB 17.0 01/14/2022    HCT 49.0 01/14/2022    MCV 90.4 01/14/2022     01/14/2022     Lab Results   Component Value Date     01/14/2022    K 4.4 01/14/2022     01/14/2022    CO2 22 (L) 01/14/2022     Lab Results   Component Value Date    CREATININE 0.8 01/14/2022     Lab Results   Component Value Date    ALT 47 01/14/2022    AST 31 01/14/2022    ALKPHOS 63 01/14/2022    BILITOT 0.6 01/14/2022     No results found for: LIPASE    Patient Active Problem List   Diagnosis    Morbid obesity with BMI of 50.0-59.9, adult ([facility] [address]    NATACHA on CPAP       An electronic signature was used to authenticate this note.     --Maribel Noel MD

## 2022-05-23 NOTE — PROGRESS NOTES
Supervising Clinical Psychologist's Attestation Statement  The patient met the criteria for indirect supervision. I discussed the findings and plans with the Clinical Psychology Trainee and agree as documented in her note . Electronically signed by Danisha Stuart, PhD       [address]    History of presenting illness: Adella Dancer is a 80-year-old, male with morbid obesity (BMI = 53.73), referred for a routine psychiatric evaluation for bariatric surgery and now engaging in therapy for mood. Assessment: Adella Dancer is overall doing better, he shared that he has not been feeling as tense and feeling more chill. He has been trying to acknowledge the level of control in situations which has helped his anxiety. From a weight management perspective, Ja felt that he was doing well, losing weight, addressing portion control, and trying to work on his protein intake. He recently was prescribed Wellbutrin 100mg BID which he has found helpful after three weeks of adjustment. His wife started to work and most significantly his eldest daughter was expelled from school. PSYCHIATRIC EXAM:  General appearance: Casually dressed and groomed appropriately. Attitude & Behavior: Cooperative and pleasant. Motor Activity & Musculoskeletal: No abnormal movements. Speech & Language: Normal rate, volume, and prosody. Gait & Station: Sitting; gait appeared unremarkable. Mood: Euthymic   Affect: congruent to mood, appropriate to setting. Thought content: Logical.  Suicidal ideation: Denied. Homicidal ideation: Denied. Thought process & Associations: Logical.  Perceptions: Appropriate. Orientation: to person, place, and time. Visual spatial: Average. Fund of knowledge: Normal.  Insight: Good. Judgment: Good.        DSM DIAGNOSIS:  Adjustment Disorder with mixed anxiety and depressed mood  History of Trauma  Morbid Obesity     INTERVENTION:  Provided psychoeducation of therapy and cognitive behavioral therapy model. Collaboratively discussed therapy goals. Empathetic reflections offered when discussing familys expectations of repressing emotions throughout childhood. Completed ABC sheet and offered mild cognitive restructuring. SUMMARY/PLAN:   Patient was actively engaged during todays session and will continue to use CBT to focus on mood in the context of trauma history. Homework includes 5-6 ABC sheets.      Psychological Doctoral Student   Gal Crystal MA    Start time: 8:50 AM  End time: 9:37 AM

## 2022-06-01 DIAGNOSIS — R94.2 ABNORMAL PFTS: ICD-10-CM

## 2022-06-01 DIAGNOSIS — R94.2 RESTRICTIVE VENTILATORY DEFECT: Primary | ICD-10-CM

## 2022-06-23 ENCOUNTER — PATIENT MESSAGE (OUTPATIENT)
Dept: FAMILY MEDICINE CLINIC | Age: 47
End: 2022-06-23

## 2022-06-23 DIAGNOSIS — F41.1 GAD (GENERALIZED ANXIETY DISORDER): ICD-10-CM

## 2022-06-23 DIAGNOSIS — Z78.9 PROFOUND INATTENTION: Primary | ICD-10-CM

## 2022-06-23 NOTE — TELEPHONE ENCOUNTER
From: Darcel Burkitt  To: Jenn Jesus  Sent: 6/23/2022 11:58 AM EDT  Subject: Welbutrin     I have had constipation constantly being on Welbutrin. How can I combat this? Is there something I should be taking?

## 2022-07-03 DIAGNOSIS — J32.4 CHRONIC PANSINUSITIS: ICD-10-CM

## 2022-07-05 RX ORDER — FLUTICASONE PROPIONATE 50 MCG
SPRAY, SUSPENSION (ML) NASAL
Qty: 48 G | Refills: 3 | Status: SHIPPED | OUTPATIENT
Start: 2022-07-05

## 2022-08-24 ENCOUNTER — TELEPHONE (OUTPATIENT)
Dept: PSYCHIATRY | Age: 47
End: 2022-08-24

## 2022-08-24 NOTE — TELEPHONE ENCOUNTER
Colette Brush wrote into the office via [employer]:    I've not been diagnosed with ADD but I'm almost positive I have it. How can I get a proper diagnosis and receive proper medicine for it. I am on certain medicines and I believe I can eliminate those with the proper ADD medicine. --Per chart; he had no showed and cancelled his last appts with Brittany Mclaughlin; he was last seen in 04/2022. Please advise.

## 2022-08-31 NOTE — PROGRESS NOTES
[facility] Sleep and [facility] Follow up note    Sultana Gutierrez             Chief Complaint: Monica Mom is here for a 1 year sleep follow up with download                                    Chief complaint and [place]: Sultana Gutierrez is a 42 y. o.oldmale came for follow up regarding his sleep apnea. He is currently using his positive airway pressure device with a CPAP pressure of 12cm H20. He denies any problems with machine or mask. However, his current mask became old and needs replacement. He was prescribed with chinstrap at the last visit. Patient not using chinstrap. He is sleeping well at night with out difficulty. He denies any daytime sleepiness. He was given prescription for Astelin nasal spray at the last visit. However, his medical insurance did not cover Astelin nasal spray. He went back to Flonase nasal spray. He is tolerating Flonase well. He is currently working at [employer]. He is working as a . He works during daytime      Review of Systems:   General/Constitutional: he gained 21lbs of weight from the last visit. No fever or chills. HENT: Negative. Eyes: Negative. Upper respiratory tract: No nasal stuffiness or post nasal drip. He restarted back using Flonase. Lower respiratory tract/ lungs: No cough or sputum production. Cardiovascular: No palpitations or chest pain. Gastrointestinal: No nausea or vomiting. Neurological: No focal neurologiacal weakness. Extremities: No edema. Musculoskeletal: No complaints. Genitourinary: No complaints. Hematological: Negative. Psychiatric/Behavioral: Negative. Skin: No itching.       Past Medical History:   Diagnosis Date    Anxiety     Depression     Hypertension     Kidney stone        Past Surgical History:   Procedure Laterality Date    CYSTOSCOPY Left 3/14/13    cystoscopy, left ureteroscopy, laser lithotripsy, left stent insertion       Social History Tobacco Use    Smoking status: Former     Types: Cigarettes     Quit date: 3/5/2000     Years since quittin.5    Smokeless tobacco: Former     Types: Snuff     Quit date: 3/5/2000   Vaping Use    Vaping Use: Never used   Substance Use Topics    Alcohol use: No    Drug use: Not Currently     Comment: many years ago marijuana, cocaine       No Known Allergies    Current Outpatient Medications   Medication Sig Dispense Refill    fluticasone (FLONASE) 50 MCG/ACT nasal spray instill 2 sprays into each nostril once daily 48 g 3    ibuprofen (ADVIL;MOTRIN) 200 MG tablet Take 200 mg by mouth every 6 hours as needed for Pain      buPROPion (WELLBUTRIN SR) 100 MG extended release tablet Take 1 tablet by mouth 2 times daily 180 tablet 3    loratadine (CLARITIN) 10 MG tablet Take 10 mg by mouth daily      escitalopram (LEXAPRO) 10 MG tablet Take 1 tablet by mouth daily 90 tablet 3    Ascorbic Acid (VITAMIN C PO) Take by mouth daily      MAGNESIUM PO Take by mouth daily      Multiple Vitamin (MULTI-VITAMIN PO) Take by mouth daily       No current facility-administered medications for this visit. Family History   Problem Relation Age of Onset    Cancer Mother         thyroid    High Blood Pressure Mother     Diabetes Maternal Grandmother     Obesity Maternal Grandmother     Heart Disease Maternal Grandfather     Diabetes Paternal Grandmother     Obesity Paternal Grandmother     Heart Disease Paternal Grandfather           /78 (Site: Left Lower Arm, Position: Sitting, Cuff Size: Large Adult)   Pulse 69   Temp 97.4 °F (36.3 °C)   Ht 6' 5\" (1.956 m)   Wt (!) 462 lb (209.6 kg)   SpO2 97% Comment: room air at rest  BMI 54.79 kg/m²     BMI:  Body mass index is 54.79 kg/m². Mallampati airway Class:4  Neck Circumference:. 21.5Inches  [place] sleepiness score /: 8  Sleep apnea quality of life questionnaire:77      Physical Exam :  Nursing note and vitals reviewed.   Constitutional: Patient appears well built and well nourished. No distress. Patient is oriented to person, place, and time. HENT:   Head: Normocephalic and atraumatic. Right Ear: External ear normal.   Left Ear: External ear normal.   Mouth/Throat: Oropharynx is clear and moist.  No oral thrush. Eyes: Conjunctivae are normal. Pupils are equal, round, and reactive to light. No scleral icterus. Neck: Neck supple. No JVD present. No tracheal deviation present. Cardiovascular: Normal rate, regular rhythm, normal heart sounds. No murmur heard. Pulmonary/Chest: Effort normal and breath sounds normal. No stridor. No respiratory distress. No wheezes. No rales. Patient exhibits no tenderness. Abdominal: Soft. Patient exhibits no distension. No tenderness. Musculoskeletal: Normal range of motion. Extremities: Patient exhibits no edema and no tenderness. Lymphadenopathy:  No cervical adenopathy. Neurological: Patient is alert and oriented to person, place, and time. Skin: Skin is warm and dry. Patient is not diaphoretic. Psychiatric: Patient  has a normal mood and affect. Patient behavior is normal.       Diagnostic Data:    CPAP titration study: Performed on 27 May 2020    Diagnostic Data: 12/19/14  AHI 15  PAP Download:   Recorded compliance dates:8/29/22-9/27/22  More than 4hour usage compliance was:90%. Average residual Apnea- Hypoapnea index on current pressue was:0.7. PAP Type cpap   Level  [id number]      Average usuage hours per day was:.[id number]                 Interface: nasal     Provider:  [x]SR[facility]             []Maximus                        [][place]          []Jeaneth                           [][facility]      []Other:            95th percentile leak : 34.2L/min- Improving from 35.7L/min of last year. Assesment:  -Moderately severe obstructive sleep apnea on treatment with a CPAP pressure of 12cm H20 - he is using his CPAP device with excellent compliance to >4hours of therapy.  His respiratory events were under good control with current therapy. His clinical symptoms improved. He is benefiting from current CPAP therapy and would like to continue the therapy.  -Hx of Hypersomnia ( Excessive daytime sleepiness) due to obstructive sleep apnea-improved  -Allergic rhinitis on treatment with Flonase. Under control. However, he would like to go for a different type of intranasal spray. He did not want to continue Flonase  -Hx of hypertension in the past. He is currently on diet. Under control. Recommendations/Plan:  -Continue Flonase 50 mcg 2 sprays in each nostril daily as needed. -He was educated and advised to apply his current mask properly and tight enough to minimize leaks.  -He will be referred to [employer] [facility]( [facility] for mask refit with a different style mask for better compliance,comfort and to minimize leaks.  -Patient will be given a prescription for chin strap to use with his current mask to avoid leaks and to improve his dryness of mouth  -At the request of patient, he was given prescription for CPAP supplies to submit to DME company.  -He was advised to continue current positive airway pressure therapy with above described pressure.  -He was advised to keep good compliance with current recommended pressure to get optimal results and clinical improvement.  -He was advised to call [employer] regarding supplies if needed. -He was advised to call my office for earlier appointment if needed for worsening of sleep symptoms.  -He was advised to continue to practice good sleep hygiene practices.  -Follow with my clinic in 12months for clinical reevaluation with review of download. -He was advised to loose weight by controlling diet and doing exercise once cleared by his family physician.   -Martine Jaimes was educated about my impression and plan.  Patient verbalizes understanding.      -I personally reviewed and updated the Past medical hx, Past surgical hx,Social hx, Family hx, Medications, Allergies in the discrete data section of the patient chart. I also reviewed pertaining labs and Pulmonary medicine,Sleep medicine related, Pathological, Microbiological and Radiological investigations.

## 2022-09-09 ENCOUNTER — HOSPITAL ENCOUNTER (OUTPATIENT)
Dept: CT IMAGING | Age: 47
Discharge: HOME | End: 2022-09-09
Payer: MEDICARE

## 2022-09-09 DIAGNOSIS — R94.2 RESTRICTIVE VENTILATORY DEFECT: ICD-10-CM

## 2022-09-09 DIAGNOSIS — R94.2 ABNORMAL PFTS: ICD-10-CM

## 2022-09-09 PROCEDURE — 71250 CT THORAX DX C-: CPT

## 2022-09-30 ENCOUNTER — OFFICE VISIT (OUTPATIENT)
Dept: PULMONOLOGY | Age: 47
End: 2022-09-30
Payer: MEDICARE

## 2022-09-30 VITALS
OXYGEN SATURATION: 97 % | HEART RATE: 69 BPM | WEIGHT: 315 LBS | SYSTOLIC BLOOD PRESSURE: 128 MMHG | HEIGHT: 77 IN | TEMPERATURE: 97.4 F | BODY MASS INDEX: 37.19 KG/M2 | DIASTOLIC BLOOD PRESSURE: 78 MMHG

## 2022-09-30 DIAGNOSIS — Z99.89 OSA ON CPAP: Primary | ICD-10-CM

## 2022-09-30 DIAGNOSIS — G47.33 OSA ON CPAP: Primary | ICD-10-CM

## 2022-09-30 PROCEDURE — 99214 OFFICE O/P EST MOD 30 MIN: CPT | Performed by: INTERNAL MEDICINE

## 2022-09-30 PROCEDURE — G8427 DOCREV CUR MEDS BY ELIG CLIN: HCPCS | Performed by: INTERNAL MEDICINE

## 2022-09-30 PROCEDURE — G8417 CALC BMI ABV UP PARAM F/U: HCPCS | Performed by: INTERNAL MEDICINE

## 2022-09-30 PROCEDURE — 1036F TOBACCO NON-USER: CPT | Performed by: INTERNAL MEDICINE

## 2022-09-30 NOTE — PROGRESS NOTES
Chief Complaint: Steffanie Funk is a f/u sleep with download     Mallampati airway Class:4  Neck Circumference:. 21.5Inches    [place] sleepiness score 9/30/22: 10  Sleep apnea quality of life questionnaire:77      Diagnostic Data:   PAP Download:   Recorded compliance dates:8/29/22-9/27/22  More than 4hour usage compliance was:90%. Average residual Apnea- Hypoapnea index on current pressue was:0.7.       PAP Type cpap   Level  [id number]     Average usuage hours per day was:.[id number]     Interface: nasal    Provider:  [x][facility]  [][facility]  [][facility]  [][facility]         [][facility] []Other:

## 2022-12-26 DIAGNOSIS — F41.1 GAD (GENERALIZED ANXIETY DISORDER): ICD-10-CM

## 2022-12-27 RX ORDER — ESCITALOPRAM OXALATE 10 MG/1
TABLET ORAL
Qty: 90 TABLET | Refills: 3 | Status: SHIPPED | OUTPATIENT
Start: 2022-12-27

## 2023-03-10 ENCOUNTER — OFFICE VISIT (OUTPATIENT)
Dept: FAMILY MEDICINE CLINIC | Age: 48
End: 2023-03-10

## 2023-03-10 VITALS
DIASTOLIC BLOOD PRESSURE: 76 MMHG | SYSTOLIC BLOOD PRESSURE: 132 MMHG | RESPIRATION RATE: 16 BRPM | BODY MASS INDEX: 54.41 KG/M2 | HEART RATE: 80 BPM | WEIGHT: 315 LBS

## 2023-03-10 DIAGNOSIS — J01.00 ACUTE NON-RECURRENT MAXILLARY SINUSITIS: ICD-10-CM

## 2023-03-10 DIAGNOSIS — F41.1 GAD (GENERALIZED ANXIETY DISORDER): ICD-10-CM

## 2023-03-10 DIAGNOSIS — N48.83 ACQUIRED BURIED PENIS: ICD-10-CM

## 2023-03-10 DIAGNOSIS — E66.01 MORBID OBESITY WITH BMI OF 50.0-59.9, ADULT (HCC): ICD-10-CM

## 2023-03-10 DIAGNOSIS — E65 ABDOMINAL PANNUS: ICD-10-CM

## 2023-03-10 DIAGNOSIS — B37.42 CANDIDAL BALANITIS: ICD-10-CM

## 2023-03-10 DIAGNOSIS — G47.33 OSA ON CPAP: ICD-10-CM

## 2023-03-10 DIAGNOSIS — Z00.00 WELL ADULT EXAM: Primary | ICD-10-CM

## 2023-03-10 DIAGNOSIS — Z99.89 OSA ON CPAP: ICD-10-CM

## 2023-03-10 RX ORDER — NYSTATIN 100000 [USP'U]/G
POWDER TOPICAL 2 TIMES DAILY
Qty: 60 G | Refills: 3 | Status: SHIPPED | OUTPATIENT
Start: 2023-03-10

## 2023-03-10 RX ORDER — DOXYCYCLINE HYCLATE 100 MG
100 TABLET ORAL 2 TIMES DAILY
Qty: 14 TABLET | Refills: 0 | Status: SHIPPED | OUTPATIENT
Start: 2023-03-10 | End: 2023-03-17

## 2023-03-10 SDOH — ECONOMIC STABILITY: FOOD INSECURITY: WITHIN THE PAST 12 MONTHS, YOU WORRIED THAT YOUR FOOD WOULD RUN OUT BEFORE YOU GOT MONEY TO BUY MORE.: NEVER TRUE

## 2023-03-10 SDOH — ECONOMIC STABILITY: INCOME INSECURITY: HOW HARD IS IT FOR YOU TO PAY FOR THE VERY BASICS LIKE FOOD, HOUSING, MEDICAL CARE, AND HEATING?: NOT HARD AT ALL

## 2023-03-10 SDOH — ECONOMIC STABILITY: FOOD INSECURITY: WITHIN THE PAST 12 MONTHS, THE FOOD YOU BOUGHT JUST DIDN'T LAST AND YOU DIDN'T HAVE MONEY TO GET MORE.: NEVER TRUE

## 2023-03-10 SDOH — ECONOMIC STABILITY: HOUSING INSECURITY
IN THE LAST 12 MONTHS, WAS THERE A TIME WHEN YOU DID NOT HAVE A STEADY PLACE TO SLEEP OR SLEPT IN A SHELTER (INCLUDING NOW)?: NO

## 2023-03-10 ASSESSMENT — PATIENT HEALTH QUESTIONNAIRE - PHQ9
SUM OF ALL RESPONSES TO PHQ QUESTIONS 1-9: 0
1. LITTLE INTEREST OR PLEASURE IN DOING THINGS: 0
4. FEELING TIRED OR HAVING LITTLE ENERGY: 0
SUM OF ALL RESPONSES TO PHQ QUESTIONS 1-9: 0
8. MOVING OR SPEAKING SO SLOWLY THAT OTHER PEOPLE COULD HAVE NOTICED. OR THE OPPOSITE, BEING SO FIGETY OR RESTLESS THAT YOU HAVE BEEN MOVING AROUND A LOT MORE THAN USUAL: 0
6. FEELING BAD ABOUT YOURSELF - OR THAT YOU ARE A FAILURE OR HAVE LET YOURSELF OR YOUR FAMILY DOWN: 0
3. TROUBLE FALLING OR STAYING ASLEEP: 0
9. THOUGHTS THAT YOU WOULD BE BETTER OFF DEAD, OR OF HURTING YOURSELF: 0
SUM OF ALL RESPONSES TO PHQ9 QUESTIONS 1 & 2: 0
5. POOR APPETITE OR OVEREATING: 0
7. TROUBLE CONCENTRATING ON THINGS, SUCH AS READING THE NEWSPAPER OR WATCHING TELEVISION: 0
2. FEELING DOWN, DEPRESSED OR HOPELESS: 0
10. IF YOU CHECKED OFF ANY PROBLEMS, HOW DIFFICULT HAVE THESE PROBLEMS MADE IT FOR YOU TO DO YOUR WORK, TAKE CARE OF THINGS AT HOME, OR GET ALONG WITH OTHER PEOPLE: 0
SUM OF ALL RESPONSES TO PHQ QUESTIONS 1-9: 0
SUM OF ALL RESPONSES TO PHQ QUESTIONS 1-9: 0

## 2023-03-10 NOTE — PROGRESS NOTES
Subjective:      Patient ID: Sultana Gutierrez 1975 is a 41 y.o. male here for evaluation. Chief Complaint   Patient presents with    Other     \"Fat around male parts causing chaffing\"       Body mass index is 54.41 kg/m². Ongoing for 10 years of buried penis due to large pannus  Will get yeast infection and irritation in area due to sweat and urine leaking. Will cause breakdown of skin    Complains of sinus pressure, facial pain, sinus congestion. Onset of 5 days. PND. HA. Vitals:    03/10/23 0913   BP: 132/76   Pulse: 80   Resp: 16       Patient Active Problem List   Diagnosis    Morbid obesity with BMI of 50.0-59.9, adult ([facility] [address]    NATACHA on CPAP       COLONOSCOPY - 2016    Vitals:    03/10/23 0913   BP: 132/76   Pulse: 80   Resp: 16        BP Readings from Last 3 Encounters:   03/10/23 132/76   09/30/22 128/78   05/20/22 126/84       Due for annual labs.     Lab Results   Component Value Date    LABA1C 5.7 01/14/2022    LABA1C 5.4 08/26/2016    LABA1C 5.8 03/11/2013     No results found for: EAG    No components found for: CHLPL  Lab Results   Component Value Date    TRIG 109 01/14/2022    TRIG 96 08/26/2016    TRIG 128 03/11/2013     Lab Results   Component Value Date    HDL 42 01/14/2022    HDL 43 08/26/2016    HDL 39 03/11/2013     Lab Results   Component Value Date    LDLCALC 137 01/14/2022    LDLCALC 94 08/26/2016    LDLCALC 96 03/11/2013     No results found for: LABVLDL      Chemistry        Component Value Date/Time     01/14/2022 0955    K 4.4 01/14/2022 0955     01/14/2022 0955    CO2 22 (L) 01/14/2022 0955    BUN 16 01/14/2022 0955    CREATININE 0.8 01/14/2022 0955        Component Value Date/Time    CALCIUM 9.8 01/14/2022 0955    ALKPHOS 63 01/14/2022 0955    AST 31 01/14/2022 0955    ALT 47 01/14/2022 0955    BILITOT 0.6 01/14/2022 0955            Lab Results   Component Value Date    TSH 1.630 01/14/2022       Lab Results   Component Value Date    WBC 5.5 01/14/2022 HGB 17.0 01/14/2022    HCT 49.0 01/14/2022    MCV 90.4 01/14/2022     01/14/2022         Health Maintenance   Topic Date Due    COVID-19 Vaccine (1) Never done    HIV screen  Never done    Hepatitis C screen  Never done    DTaP/Tdap/Td vaccine (1 - Tdap) 03/30/2016    Flu vaccine (1) 08/01/2022    A1C test (Diabetic or Prediabetic)  01/14/2023    Depression Monitoring  03/10/2024    Colorectal Cancer Screen  09/06/2026    Lipids  01/14/2027    Hepatitis A vaccine  Aged Out    Hib vaccine  Aged Out    Meningococcal (ACWY) vaccine  Aged Out    Pneumococcal 0-64 years Vaccine  Aged [employer] History   Administered Date(s) Administered    Influenza Virus Vaccine 11/10/2017    Td, unspecified formulation 03/29/2016       Review of Systems    Objective:   Physical Exam     Assessment:       Diagnosis Orders   1. Well adult exam  CBC    Comprehensive Metabolic Panel    Hemoglobin A1C    Lipid Panel    TSH with Reflex      2. Acquired buried penis  Coleman Puente MD, Urology, [place]      3. Candidal balanitis  nystatin (MYCOSTATIN) 863614 UNIT/GM powder    Mayuri Ortiz MD, Urology, [place]      4. Acute non-recurrent maxillary sinusitis  doxycycline hyclate (VIBRA-TABS) 100 MG tablet      5. Abdominal pannus        6. Morbid obesity with BMI of 50.0-59.9, adult ([facility] [address]        7. NATACHA on CPAP        8.  JOE (generalized anxiety disorder)                Plan:      Refer to Urology regarding options   - patient aware weight big part to do with it  Nystatin Powder - keep area dry  ATB for sinus infection  Screening Labs  Diet and exercise for weight loss dicussed  RTO PRN      Current Outpatient Medications   Medication Sig Dispense Refill    nystatin (MYCOSTATIN) 261815 UNIT/GM powder Apply topically 2 times daily 60 g 3    doxycycline hyclate (VIBRA-TABS) 100 MG tablet Take 1 tablet by mouth 2 times daily for 7 days 14 tablet 0    escitalopram (LEXAPRO) 10 MG tablet take 1 tablet by mouth once daily 90 tablet 3    fluticasone (FLONASE) 50 MCG/ACT nasal spray instill 2 sprays into each nostril once daily 48 g 3    ibuprofen (ADVIL;MOTRIN) 200 MG tablet Take 200 mg by mouth every 6 hours as needed for Pain      buPROPion (WELLBUTRIN SR) 100 MG extended release tablet Take 1 tablet by mouth 2 times daily 180 tablet 3    loratadine (CLARITIN) 10 MG tablet Take 10 mg by mouth daily      Ascorbic Acid (VITAMIN C PO) Take by mouth daily      MAGNESIUM PO Take by mouth daily      Multiple Vitamin (MULTI-VITAMIN PO) Take by mouth daily       No current facility-administered medications for this visit.

## 2023-04-25 DIAGNOSIS — F41.1 GAD (GENERALIZED ANXIETY DISORDER): ICD-10-CM

## 2023-04-26 RX ORDER — BUPROPION HYDROCHLORIDE 100 MG/1
TABLET, EXTENDED RELEASE ORAL
Qty: 180 TABLET | Refills: 3 | Status: SHIPPED | OUTPATIENT
Start: 2023-04-26

## 2023-06-09 ENCOUNTER — OFFICE VISIT (OUTPATIENT)
Dept: UROLOGY | Age: 48
End: 2023-06-09
Payer: MEDICAID

## 2023-06-09 VITALS
DIASTOLIC BLOOD PRESSURE: 82 MMHG | HEIGHT: 77 IN | SYSTOLIC BLOOD PRESSURE: 138 MMHG | BODY MASS INDEX: 37.19 KG/M2 | WEIGHT: 315 LBS

## 2023-06-09 DIAGNOSIS — E65 PANNUS, ABDOMINAL: ICD-10-CM

## 2023-06-09 DIAGNOSIS — J32.4 CHRONIC PANSINUSITIS: ICD-10-CM

## 2023-06-09 DIAGNOSIS — N48.83 ACQUIRED BURIED PENIS: Primary | ICD-10-CM

## 2023-06-09 PROCEDURE — 99204 OFFICE O/P NEW MOD 45 MIN: CPT | Performed by: UROLOGY

## 2023-06-09 RX ORDER — FLUTICASONE PROPIONATE 50 MCG
2 SPRAY, SUSPENSION (ML) NASAL DAILY
Qty: 48 G | Refills: 3 | Status: SHIPPED | OUTPATIENT
Start: 2023-06-09

## 2023-06-09 NOTE — TELEPHONE ENCOUNTER
Patient requesting refill of Flonase to [facility].   Please refill if appropriate  Will check with pharmacy after noon

## 2023-06-09 NOTE — PROGRESS NOTES
Giselle Landon MD   Urology Clinic office Visit      Patient:  Nikki Bryant  YOB: 1975  Date: 6/9/2023    HISTORY OF PRESENT ILLNESS:   The patient is a 41 y.o. male who presents today for evaluation of the following problem(s):      1. Acquired buried penis    2. Pannus, abdominal           Overall the problem(s) : are worsening. Associated Symptoms: No dysuria, gross hematuria. Pain Severity:      Summary of old records: N/A  (Patient's old records, notes and chart reviewed and summarized above.)      Onset years  Severely obese  With aging tissue is hanging more and has acquired a buried penis  Was circumcised at birth  Has chaffing and cracking sometimes  Hard to retract, have sex, urinate      I independently reviewed and verified the images and reports from:    CT CHEST HIGH RESOLUTION    Result Date: 9/10/2022  PROCEDURE: CT CHEST HIGH RESOLUTION CLINICAL INFORMATION: Restrictive ventilatory defect, Abnormal PFTs. COMPARISON: No prior study. TECHNIQUE: 5 mm axial images were obtained through the chest without contrast. Sagittal and coronal reconstructions were obtained. Selected high-resolution images were also obtained. The patient was imaged in the supine position. All CT scans at this facility use dose modulation, iterative reconstruction, and/or weight-based dosing when appropriate to reduce radiation dose to as low as reasonably achievable. FINDINGS: The heart size is normal. The aorta is of normal caliber. There is no gross abnormality of the pulmonary artery and its proximal branches. There is no mediastinal, axillary or hilar adenopathy. There is no pericardial or pleural effusion. No pulmonary infiltrates are present. No pulmonary masses or nodules are noted. On the high-resolution images, the lung fields appear clear. There is no peribronchial thickening. There is no thickening of the interlobar septa. There are no fibrotic changes.  There are no suspicious findings in

## 2023-07-05 DIAGNOSIS — E65 ABDOMINAL PANNUS: ICD-10-CM

## 2023-07-05 DIAGNOSIS — N48.83 ACQUIRED BURIED PENIS: Primary | ICD-10-CM

## 2023-09-28 ENCOUNTER — TELEPHONE (OUTPATIENT)
Dept: PULMONOLOGY | Age: 48
End: 2023-09-28

## 2023-09-28 NOTE — TELEPHONE ENCOUNTER
Patient cancelled and stated he will callback to reschedule. Patient stated he dislocated knee and is unable to come in.

## 2023-12-07 ENCOUNTER — PATIENT MESSAGE (OUTPATIENT)
Dept: FAMILY MEDICINE CLINIC | Age: 48
End: 2023-12-07

## 2023-12-07 DIAGNOSIS — B37.42 CANDIDAL BALANITIS: ICD-10-CM

## 2023-12-07 DIAGNOSIS — E66.01 MORBID OBESITY WITH BMI OF 50.0-59.9, ADULT (HCC): ICD-10-CM

## 2023-12-07 DIAGNOSIS — N48.83 ACQUIRED BURIED PENIS: Primary | ICD-10-CM

## 2023-12-07 RX ORDER — NYSTATIN 100000 [USP'U]/G
POWDER TOPICAL 2 TIMES DAILY
Qty: 60 G | Refills: 3 | Status: SHIPPED | OUTPATIENT
Start: 2023-12-07

## 2023-12-07 NOTE — TELEPHONE ENCOUNTER
From: Mee Rivera  To: Fab Board  Sent: 12/7/2023 6:54 AM EST  Subject: Urology    I had an appointment scheduled with urology back in July. The appointment was canceled due to an agreement not yet reached with insurance. I needed to see about getting a referral due to my buried penis issue.

## 2024-01-01 NOTE — PATIENT INSTRUCTIONS
Recommendations/Plan:  -He was educated and advised to apply his current mask properly and tight enough to minimize leaks.  -He will be referred to [facility]( [id number] for mask refit with a different style mask for better compliance,comfort and to minimize leaks.  -He was advised to continue current positive airway pressure therapy with above described pressure.  -He was advised to keep good compliance with current recommended pressure to get optimal results and clinical improvement.  -Follow with my clinic in 12months for clinical reevaluation with review of download. -He was advised to call [facility] regarding supplies if needed. -He was advised to call my office for earlier appointment if needed for worsening of sleep symptoms.  -He was advised to continue to practice good sleep hygiene practices.  -He was advised to loose weight by controlling diet and doing exercise once cleared by his family physician.   -Bee Souza was educated about my impression and plan.  Patient verbalizes understanding
Patient states no history

## 2024-01-17 ENCOUNTER — OFFICE VISIT (OUTPATIENT)
Dept: FAMILY MEDICINE CLINIC | Age: 49
End: 2024-01-17
Payer: MEDICAID

## 2024-01-17 ENCOUNTER — TELEPHONE (OUTPATIENT)
Dept: FAMILY MEDICINE CLINIC | Age: 49
End: 2024-01-17

## 2024-01-17 VITALS
BODY MASS INDEX: 37.19 KG/M2 | SYSTOLIC BLOOD PRESSURE: 136 MMHG | DIASTOLIC BLOOD PRESSURE: 80 MMHG | WEIGHT: 315 LBS | RESPIRATION RATE: 18 BRPM | HEIGHT: 77 IN | HEART RATE: 88 BPM

## 2024-01-17 DIAGNOSIS — E66.01 MORBID OBESITY WITH BMI OF 50.0-59.9, ADULT (HCC): ICD-10-CM

## 2024-01-17 DIAGNOSIS — I82.4Y2 DVT, LOWER EXTREMITY, PROXIMAL, ACUTE, LEFT (HCC): Primary | ICD-10-CM

## 2024-01-17 PROCEDURE — 99214 OFFICE O/P EST MOD 30 MIN: CPT | Performed by: NURSE PRACTITIONER

## 2024-01-17 ASSESSMENT — ENCOUNTER SYMPTOMS
ABDOMINAL PAIN: 0
COUGH: 0
SHORTNESS OF BREATH: 0
NAUSEA: 0

## 2024-01-17 ASSESSMENT — PATIENT HEALTH QUESTIONNAIRE - PHQ9
2. FEELING DOWN, DEPRESSED OR HOPELESS: 0
4. FEELING TIRED OR HAVING LITTLE ENERGY: 0
1. LITTLE INTEREST OR PLEASURE IN DOING THINGS: 0
7. TROUBLE CONCENTRATING ON THINGS, SUCH AS READING THE NEWSPAPER OR WATCHING TELEVISION: 3
SUM OF ALL RESPONSES TO PHQ QUESTIONS 1-9: 3
3. TROUBLE FALLING OR STAYING ASLEEP: 0
6. FEELING BAD ABOUT YOURSELF - OR THAT YOU ARE A FAILURE OR HAVE LET YOURSELF OR YOUR FAMILY DOWN: 0
9. THOUGHTS THAT YOU WOULD BE BETTER OFF DEAD, OR OF HURTING YOURSELF: 0
SUM OF ALL RESPONSES TO PHQ QUESTIONS 1-9: 3
10. IF YOU CHECKED OFF ANY PROBLEMS, HOW DIFFICULT HAVE THESE PROBLEMS MADE IT FOR YOU TO DO YOUR WORK, TAKE CARE OF THINGS AT HOME, OR GET ALONG WITH OTHER PEOPLE: 0
8. MOVING OR SPEAKING SO SLOWLY THAT OTHER PEOPLE COULD HAVE NOTICED. OR THE OPPOSITE, BEING SO FIGETY OR RESTLESS THAT YOU HAVE BEEN MOVING AROUND A LOT MORE THAN USUAL: 0
5. POOR APPETITE OR OVEREATING: 0
SUM OF ALL RESPONSES TO PHQ9 QUESTIONS 1 & 2: 0

## 2024-01-17 NOTE — PROGRESS NOTES
Dakotah Bertrand (1975) 48 y.o. male here for evaluation of the following chief complaint(s):      HPI:  Chief Complaint   Patient presents with    Follow-up     Had surgery about 5 weeks ago, had a blood clot and now need to discuss blood thinners        Had knee surgery 5 weeks ago Dr. Queen.  Was having some swelling behind knee nad upper calf pain.  Calf pain worse than knee surgical site.  US Doppler was ordered by ORTHO for DVT.   Here for tx.   Body mass index is 53.86 kg/m².    Activity is increasing.  Use of brace and crutch.     Body mass index is 53.86 kg/m².    US Doppler 1/17/23:  FINDINGS:        There is echogenic noncompressible focus within the posterior tibial vein    consistent with a thrombus.        There is a 2.8 x 1.4 x 4.9 cm fluid focus within the popliteal fossa.        IMPRESSION:        Thrombosed posterior tibial vein.    2.8 x 1.4 x 4.9 cm popliteal cyst.     Vitals:    01/17/24 1041   BP: 136/80   Pulse: 88   Resp: 18       Patient Active Problem List   Diagnosis    Morbid obesity with BMI of 50.0-59.9, adult (HCC)    NATACHA on CPAP       SUBJECTIVE/OBJECTIVE:  Review of Systems   Constitutional:  Negative for chills and fever.   HENT: Negative.     Respiratory:  Negative for cough and shortness of breath.    Cardiovascular:  Negative for chest pain.   Gastrointestinal:  Negative for abdominal pain and nausea.   Musculoskeletal:  Positive for arthralgias and myalgias.   Skin:  Negative for rash.   Neurological:  Negative for dizziness, light-headedness and headaches.   Psychiatric/Behavioral: Negative.         Physical Exam  Constitutional:       General: He is not in acute distress.  Eyes:      Pupils: Pupils are equal, round, and reactive to light.   Cardiovascular:      Rate and Rhythm: Normal rate and regular rhythm.      Heart sounds: No murmur heard.  Pulmonary:      Effort: Pulmonary effort is normal.      Breath sounds: Normal breath sounds. No wheezing.   Abdominal:

## 2024-01-17 NOTE — TELEPHONE ENCOUNTER
Lyndsey with [facility] [place] called office with a STAT US result. Dr. Queen with [facility] ordered a venous doppler and it shows a DVT in the left posterior tibial vein. She contacted Dr. Queen and was advised to contact PCP \"since they treat this any ways\". Pt is still with Lyndsey in [place].    Call was given to Karl RODRIGUEZ.

## 2024-01-26 DIAGNOSIS — I82.4Y2 DVT, LOWER EXTREMITY, PROXIMAL, ACUTE, LEFT (HCC): ICD-10-CM

## 2024-03-20 SDOH — ECONOMIC STABILITY: INCOME INSECURITY: HOW HARD IS IT FOR YOU TO PAY FOR THE VERY BASICS LIKE FOOD, HOUSING, MEDICAL CARE, AND HEATING?: NOT HARD AT ALL

## 2024-03-20 SDOH — ECONOMIC STABILITY: FOOD INSECURITY: WITHIN THE PAST 12 MONTHS, THE FOOD YOU BOUGHT JUST DIDN'T LAST AND YOU DIDN'T HAVE MONEY TO GET MORE.: NEVER TRUE

## 2024-03-20 SDOH — ECONOMIC STABILITY: FOOD INSECURITY: WITHIN THE PAST 12 MONTHS, YOU WORRIED THAT YOUR FOOD WOULD RUN OUT BEFORE YOU GOT MONEY TO BUY MORE.: NEVER TRUE

## 2024-03-21 ENCOUNTER — OFFICE VISIT (OUTPATIENT)
Dept: FAMILY MEDICINE CLINIC | Age: 49
End: 2024-03-21
Payer: MEDICAID

## 2024-03-21 VITALS
HEART RATE: 80 BPM | BODY MASS INDEX: 56.3 KG/M2 | DIASTOLIC BLOOD PRESSURE: 82 MMHG | SYSTOLIC BLOOD PRESSURE: 156 MMHG | WEIGHT: 315 LBS | RESPIRATION RATE: 20 BRPM

## 2024-03-21 DIAGNOSIS — F41.1 GAD (GENERALIZED ANXIETY DISORDER): ICD-10-CM

## 2024-03-21 DIAGNOSIS — Z78.9 PROFOUND INATTENTION: ICD-10-CM

## 2024-03-21 DIAGNOSIS — F39 MOOD DISORDER (HCC): Primary | ICD-10-CM

## 2024-03-21 DIAGNOSIS — B37.42 CANDIDAL BALANITIS: ICD-10-CM

## 2024-03-21 PROCEDURE — G2211 COMPLEX E/M VISIT ADD ON: HCPCS | Performed by: NURSE PRACTITIONER

## 2024-03-21 PROCEDURE — 99214 OFFICE O/P EST MOD 30 MIN: CPT | Performed by: NURSE PRACTITIONER

## 2024-03-21 RX ORDER — NYSTATIN 100000 [USP'U]/G
POWDER TOPICAL 2 TIMES DAILY
Qty: 60 G | Refills: 3 | Status: SHIPPED | OUTPATIENT
Start: 2024-03-21

## 2024-03-21 ASSESSMENT — ENCOUNTER SYMPTOMS
SHORTNESS OF BREATH: 0
ABDOMINAL PAIN: 0
NAUSEA: 0
COUGH: 0

## 2024-03-21 NOTE — PROGRESS NOTES
Dakotah Bertrand (1975) 48 y.o. male here for evaluation of the following chief complaint(s):      HPI:  Chief Complaint   Patient presents with    Follow-up    Referral - General     ADHD    Medication Refill       Concerns for Bipolar and ADD.     Family hx of ADD - mom and dad.     Trouble concentration   Swings of hyper and hypofocus.   Issues in school on Math and ability to focus.  Anxiety with school due to inability to concentrate on his test and homework.   Has been mention to provider in 2022 to psychologist when he was going through Weight Management.    Laura.  Emotional.   Highs and lows of emotional.  On Lexapro 10 mg and Wellbutrin 100 mg ER since 2021.  Does help with symptoms but feels like better controlled came be obtained.    Difference when he is on his medication and not on his medications.  Wife is noticing changes with his mood.  Wonders about being Bipolar    HPI in 11/2021 when was first placed on Lexapro:  Last 2-3 years with anxious mood.   Has been attempting to control via non-pharm treatment.  Irritable.   Easily anger.   Random gloom and doom sensations.  Seem to be worsening hence reason for reaching out.      Family hx of anxiety.     Vitals:    03/21/24 0929   BP: (!) 156/82   Pulse: 80   Resp: 20       Patient Active Problem List   Diagnosis    Morbid obesity with BMI of 50.0-59.9, adult (HCC)    NATACHA on CPAP       SUBJECTIVE/OBJECTIVE:  Review of Systems   Constitutional:  Negative for chills and fever.   HENT: Negative.     Respiratory:  Negative for cough and shortness of breath.    Cardiovascular:  Negative for chest pain.   Gastrointestinal:  Negative for abdominal pain and nausea.   Skin:  Negative for rash.   Neurological:  Negative for dizziness, light-headedness and headaches.   Psychiatric/Behavioral:  Positive for decreased concentration and dysphoric mood. The patient is nervous/anxious.        Physical Exam  Constitutional:       General: He is not in acute

## 2024-04-06 DIAGNOSIS — F41.1 GAD (GENERALIZED ANXIETY DISORDER): ICD-10-CM

## 2024-04-08 RX ORDER — BUPROPION HYDROCHLORIDE 100 MG/1
TABLET, EXTENDED RELEASE ORAL
Qty: 180 TABLET | Refills: 3 | Status: SHIPPED | OUTPATIENT
Start: 2024-04-08

## 2024-05-11 DIAGNOSIS — J32.4 CHRONIC PANSINUSITIS: ICD-10-CM

## 2024-05-13 RX ORDER — FLUTICASONE PROPIONATE 50 MCG
2 SPRAY, SUSPENSION (ML) NASAL DAILY
Qty: 48 G | Refills: 3 | Status: SHIPPED | OUTPATIENT
Start: 2024-05-13

## 2024-05-15 ENCOUNTER — OFFICE VISIT (OUTPATIENT)
Dept: UROLOGY | Age: 49
End: 2024-05-15
Payer: MEDICAID

## 2024-05-15 VITALS
WEIGHT: 315 LBS | BODY MASS INDEX: 37.19 KG/M2 | DIASTOLIC BLOOD PRESSURE: 76 MMHG | HEIGHT: 77 IN | SYSTOLIC BLOOD PRESSURE: 138 MMHG

## 2024-05-15 DIAGNOSIS — L90.5 CICATRIX: ICD-10-CM

## 2024-05-15 DIAGNOSIS — E65 PANNUS, ABDOMINAL: ICD-10-CM

## 2024-05-15 DIAGNOSIS — N48.83 ACQUIRED BURIED PENIS: Primary | ICD-10-CM

## 2024-05-15 PROCEDURE — 99214 OFFICE O/P EST MOD 30 MIN: CPT | Performed by: UROLOGY

## 2024-05-15 RX ORDER — CLOTRIMAZOLE AND BETAMETHASONE DIPROPIONATE 10; .64 MG/G; MG/G
CREAM TOPICAL
Qty: 45 G | Refills: 3 | Status: SHIPPED | OUTPATIENT
Start: 2024-05-15

## 2024-05-15 NOTE — PROGRESS NOTES
Urology Clinic office Visit      Patient:  Dakotah Bertrand  YOB: 1975  Date: 5/15/2024    HISTORY OF PRESENT ILLNESS:   The patient is a 48 y.o. male who presents today for evaluation of the following problem(s):      1. Acquired buried penis    2. Pannus, abdominal    3. Cicatrix             Overall the problem(s) : are worsening.  Associated Symptoms: No dysuria, gross hematuria.  Pain Severity:      Summary of old records: N/A  (Patient's old records, notes and chart reviewed and summarized above.)      Onset years  Severely obese  With aging tissue is hanging more and has acquired a buried penis  Was circumcised at birth  Has chaffing and cracking sometimes  Hard to retract, have sex, urinate      I independently reviewed and verified the images and reports from:    CT CHEST HIGH RESOLUTION    Result Date: 9/10/2022  PROCEDURE: CT CHEST HIGH RESOLUTION CLINICAL INFORMATION: Restrictive ventilatory defect, Abnormal PFTs. COMPARISON: No prior study. TECHNIQUE: 5 mm axial images were obtained through the chest without contrast. Sagittal and coronal reconstructions were obtained. Selected high-resolution images were also obtained. The patient was imaged in the supine position. All CT scans at this facility use dose modulation, iterative reconstruction, and/or weight-based dosing when appropriate to reduce radiation dose to as low as reasonably achievable. FINDINGS: The heart size is normal. The aorta is of normal caliber. There is no gross abnormality of the pulmonary artery and its proximal branches. There is no mediastinal, axillary or hilar adenopathy. There is no pericardial or pleural effusion. No pulmonary infiltrates are present. No pulmonary masses or nodules are noted. On the high-resolution images, the lung fields appear clear. There is no peribronchial thickening. There is no thickening of the interlobar septa. There are no fibrotic changes. There are no suspicious findings in the

## 2024-06-26 ENCOUNTER — TELEMEDICINE (OUTPATIENT)
Dept: PSYCHIATRY | Age: 49
End: 2024-06-26

## 2024-06-26 DIAGNOSIS — R41.840 CONCENTRATION DEFICIT: ICD-10-CM

## 2024-06-26 DIAGNOSIS — F41.1 GAD (GENERALIZED ANXIETY DISORDER): Primary | ICD-10-CM

## 2024-06-26 DIAGNOSIS — F33.1 MDD (MAJOR DEPRESSIVE DISORDER), RECURRENT EPISODE, MODERATE (HCC): ICD-10-CM

## 2024-06-26 RX ORDER — ESCITALOPRAM OXALATE 10 MG/1
20 TABLET ORAL DAILY
Qty: 90 TABLET | Refills: 3
Start: 2024-06-26

## 2024-06-26 RX ORDER — BUPROPION HYDROCHLORIDE 150 MG/1
150 TABLET ORAL EVERY MORNING
Qty: 30 TABLET | Refills: 2 | Status: SHIPPED | OUTPATIENT
Start: 2024-06-26

## 2024-06-26 ASSESSMENT — ANXIETY QUESTIONNAIRES
1. FEELING NERVOUS, ANXIOUS, OR ON EDGE: SEVERAL DAYS
GAD7 TOTAL SCORE: 7
3. WORRYING TOO MUCH ABOUT DIFFERENT THINGS: SEVERAL DAYS
4. TROUBLE RELAXING: SEVERAL DAYS
1. FEELING NERVOUS, ANXIOUS, OR ON EDGE: SEVERAL DAYS
5. BEING SO RESTLESS THAT IT IS HARD TO SIT STILL: SEVERAL DAYS
4. TROUBLE RELAXING: SEVERAL DAYS
5. BEING SO RESTLESS THAT IT IS HARD TO SIT STILL: SEVERAL DAYS
7. FEELING AFRAID AS IF SOMETHING AWFUL MIGHT HAPPEN: SEVERAL DAYS
2. NOT BEING ABLE TO STOP OR CONTROL WORRYING: NOT AT ALL
2. NOT BEING ABLE TO STOP OR CONTROL WORRYING: NOT AT ALL
3. WORRYING TOO MUCH ABOUT DIFFERENT THINGS: SEVERAL DAYS
6. BECOMING EASILY ANNOYED OR IRRITABLE: MORE THAN HALF THE DAYS
IF YOU CHECKED OFF ANY PROBLEMS ON THIS QUESTIONNAIRE, HOW DIFFICULT HAVE THESE PROBLEMS MADE IT FOR YOU TO DO YOUR WORK, TAKE CARE OF THINGS AT HOME, OR GET ALONG WITH OTHER PEOPLE: SOMEWHAT DIFFICULT
6. BECOMING EASILY ANNOYED OR IRRITABLE: MORE THAN HALF THE DAYS
IF YOU CHECKED OFF ANY PROBLEMS ON THIS QUESTIONNAIRE, HOW DIFFICULT HAVE THESE PROBLEMS MADE IT FOR YOU TO DO YOUR WORK, TAKE CARE OF THINGS AT HOME, OR GET ALONG WITH OTHER PEOPLE: SOMEWHAT DIFFICULT
7. FEELING AFRAID AS IF SOMETHING AWFUL MIGHT HAPPEN: SEVERAL DAYS

## 2024-06-26 ASSESSMENT — PATIENT HEALTH QUESTIONNAIRE - PHQ9
8. MOVING OR SPEAKING SO SLOWLY THAT OTHER PEOPLE COULD HAVE NOTICED. OR THE OPPOSITE - BEING SO FIDGETY OR RESTLESS THAT YOU HAVE BEEN MOVING AROUND A LOT MORE THAN USUAL: SEVERAL DAYS
5. POOR APPETITE OR OVEREATING: MORE THAN HALF THE DAYS
4. FEELING TIRED OR HAVING LITTLE ENERGY: MORE THAN HALF THE DAYS
6. FEELING BAD ABOUT YOURSELF - OR THAT YOU ARE A FAILURE OR HAVE LET YOURSELF OR YOUR FAMILY DOWN: SEVERAL DAYS
SUM OF ALL RESPONSES TO PHQ9 QUESTIONS 1 & 2: 2
3. TROUBLE FALLING OR STAYING ASLEEP: SEVERAL DAYS
1. LITTLE INTEREST OR PLEASURE IN DOING THINGS: SEVERAL DAYS
7. TROUBLE CONCENTRATING ON THINGS, SUCH AS READING THE NEWSPAPER OR WATCHING TELEVISION: MORE THAN HALF THE DAYS
8. MOVING OR SPEAKING SO SLOWLY THAT OTHER PEOPLE COULD HAVE NOTICED. OR THE OPPOSITE, BEING SO FIGETY OR RESTLESS THAT YOU HAVE BEEN MOVING AROUND A LOT MORE THAN USUAL: SEVERAL DAYS
SUM OF ALL RESPONSES TO PHQ QUESTIONS 1-9: 11
5. POOR APPETITE OR OVEREATING: MORE THAN HALF THE DAYS
2. FEELING DOWN, DEPRESSED OR HOPELESS: SEVERAL DAYS
6. FEELING BAD ABOUT YOURSELF - OR THAT YOU ARE A FAILURE OR HAVE LET YOURSELF OR YOUR FAMILY DOWN: SEVERAL DAYS
10. IF YOU CHECKED OFF ANY PROBLEMS, HOW DIFFICULT HAVE THESE PROBLEMS MADE IT FOR YOU TO DO YOUR WORK, TAKE CARE OF THINGS AT HOME, OR GET ALONG WITH OTHER PEOPLE: SOMEWHAT DIFFICULT
1. LITTLE INTEREST OR PLEASURE IN DOING THINGS: SEVERAL DAYS
SUM OF ALL RESPONSES TO PHQ QUESTIONS 1-9: 11
2. FEELING DOWN, DEPRESSED OR HOPELESS: SEVERAL DAYS
9. THOUGHTS THAT YOU WOULD BE BETTER OFF DEAD, OR OF HURTING YOURSELF: NOT AT ALL
9. THOUGHTS THAT YOU WOULD BE BETTER OFF DEAD, OR OF HURTING YOURSELF: NOT AT ALL
SUM OF ALL RESPONSES TO PHQ QUESTIONS 1-9: 11
3. TROUBLE FALLING OR STAYING ASLEEP: SEVERAL DAYS
4. FEELING TIRED OR HAVING LITTLE ENERGY: MORE THAN HALF THE DAYS
10. IF YOU CHECKED OFF ANY PROBLEMS, HOW DIFFICULT HAVE THESE PROBLEMS MADE IT FOR YOU TO DO YOUR WORK, TAKE CARE OF THINGS AT HOME, OR GET ALONG WITH OTHER PEOPLE: SOMEWHAT DIFFICULT
7. TROUBLE CONCENTRATING ON THINGS, SUCH AS READING THE NEWSPAPER OR WATCHING TELEVISION: MORE THAN HALF THE DAYS

## 2024-06-26 NOTE — PROGRESS NOTES
[facility] [place] [facility]  [facility] PSYCHIATRY  [address] [facility] [place] [id number] [phone]    Initial Psychiatric Eval    Patient:  Dakotah Bertrand  YOB: 1975  PCP:  Karl Lima APRN - CNP    Visit Date:  6/26/2024    TELEHEALTH EVALUATION     CC: Psychiatric Evaluation    HPI:   Dakotah Bertrand is a 48 y.o. male who is presenting today as a new patient at [facility].  Referred by PCP for diagnostic clarification.  Referral expressed concern for possible bipolar disorder as well as underlying anxiety and possible ADHD.  Records indicate patient is on Lexapro and Wellbutrin.     Patient reports presenting today due to concerns for ADHD.  Reports having a hard time sustaining attention at home and will often struggle within conversations because his mind is all over the place.  Please see ADHD symptom report below.    Patient endorses a history of anxiety for which he has been on Lexapro for the last 3 to 4 years.  Endorses being a worrier.  Often has a hard time shutting his mind down.  Reports low frustration tolerance.  When he gets overwhelmed he will often have anger outburst which result in the yelling.  Denies getting physical.  Reports feeling a lot of guilt after these episodes.  Reports some depressive symptoms but reports these are often related to the guilt associated with anger outburst.  Has had a history of some suicidal thoughts but no attempts.  Denies current or recent suicidal or homicidal thoughts.  Patient has had issues with sleep but has been well controlled on CPAP machine for a few years.  There had been some concern for bipolar disorder due to anger outburst and mood swings but on further evaluation today patient reports mood swings around the manner of a few hours or a day.  He denies symptoms consistent with tg or hypomania today.  Denies a history of psychosis.  Does report a history of issues

## 2024-06-28 NOTE — PATIENT INSTRUCTIONS
Plan:  Dakotah Bertrand, it was nice seeing you today  Continue Lexapro 20mg daily  Start Wellbutrin XL 150mg daily  I would like you and your wife to complete ADHD screeners I sent via [employer]. If you do not get these please reach out  Follow up in 4 weeks but reach out sooner if needed      -Please take medications as prescribed  -Refrain from alcohol or drug use  -Seek emergency help via the emergency and/or calling 911 should symptoms become severe, worsen, or with other concerning symptoms.Go immediately to the emergency room and/or call 911 with any suicidal or homicidal ideations or if audio/visual hallucinations develop.   -Contact office with any questions or concerns.      Crisis phone numbers:  988-national suicide hotline, call or text  [facility] [place], [facility] [phone].  [facility] [phone]  [facility] [phone].  [facility] [phone].  [facility] [phone].  [facility] [phone].

## 2024-08-01 ENCOUNTER — OFFICE VISIT (OUTPATIENT)
Dept: PSYCHIATRY | Age: 49
End: 2024-08-01

## 2024-08-01 DIAGNOSIS — F33.1 MDD (MAJOR DEPRESSIVE DISORDER), RECURRENT EPISODE, MODERATE (HCC): ICD-10-CM

## 2024-08-01 DIAGNOSIS — F41.1 GAD (GENERALIZED ANXIETY DISORDER): Primary | ICD-10-CM

## 2024-08-01 DIAGNOSIS — R41.840 CONCENTRATION DEFICIT: ICD-10-CM

## 2024-08-01 RX ORDER — CLONIDINE HYDROCHLORIDE 0.1 MG/1
0.1 TABLET ORAL 2 TIMES DAILY
Qty: 60 TABLET | Refills: 2 | Status: SHIPPED | OUTPATIENT
Start: 2024-08-01

## 2024-08-01 RX ORDER — ESCITALOPRAM OXALATE 20 MG/1
20 TABLET ORAL DAILY
Qty: 90 TABLET | Refills: 1 | Status: SHIPPED | OUTPATIENT
Start: 2024-08-01

## 2024-08-01 ASSESSMENT — PATIENT HEALTH QUESTIONNAIRE - PHQ9
10. IF YOU CHECKED OFF ANY PROBLEMS, HOW DIFFICULT HAVE THESE PROBLEMS MADE IT FOR YOU TO DO YOUR WORK, TAKE CARE OF THINGS AT HOME, OR GET ALONG WITH OTHER PEOPLE: VERY DIFFICULT
6. FEELING BAD ABOUT YOURSELF - OR THAT YOU ARE A FAILURE OR HAVE LET YOURSELF OR YOUR FAMILY DOWN: MORE THAN HALF THE DAYS
1. LITTLE INTEREST OR PLEASURE IN DOING THINGS: MORE THAN HALF THE DAYS
SUM OF ALL RESPONSES TO PHQ9 QUESTIONS 1 & 2: 4
5. POOR APPETITE OR OVEREATING: MORE THAN HALF THE DAYS
SUM OF ALL RESPONSES TO PHQ QUESTIONS 1-9: 16
4. FEELING TIRED OR HAVING LITTLE ENERGY: NEARLY EVERY DAY
8. MOVING OR SPEAKING SO SLOWLY THAT OTHER PEOPLE COULD HAVE NOTICED. OR THE OPPOSITE, BEING SO FIGETY OR RESTLESS THAT YOU HAVE BEEN MOVING AROUND A LOT MORE THAN USUAL: MORE THAN HALF THE DAYS
SUM OF ALL RESPONSES TO PHQ QUESTIONS 1-9: 16
2. FEELING DOWN, DEPRESSED OR HOPELESS: MORE THAN HALF THE DAYS
3. TROUBLE FALLING OR STAYING ASLEEP: NOT AT ALL
SUM OF ALL RESPONSES TO PHQ QUESTIONS 1-9: 16
SUM OF ALL RESPONSES TO PHQ QUESTIONS 1-9: 16
7. TROUBLE CONCENTRATING ON THINGS, SUCH AS READING THE NEWSPAPER OR WATCHING TELEVISION: NEARLY EVERY DAY
9. THOUGHTS THAT YOU WOULD BE BETTER OFF DEAD, OR OF HURTING YOURSELF: NOT AT ALL

## 2024-08-01 ASSESSMENT — ANXIETY QUESTIONNAIRES
1. FEELING NERVOUS, ANXIOUS, OR ON EDGE: SEVERAL DAYS
5. BEING SO RESTLESS THAT IT IS HARD TO SIT STILL: SEVERAL DAYS
IF YOU CHECKED OFF ANY PROBLEMS ON THIS QUESTIONNAIRE, HOW DIFFICULT HAVE THESE PROBLEMS MADE IT FOR YOU TO DO YOUR WORK, TAKE CARE OF THINGS AT HOME, OR GET ALONG WITH OTHER PEOPLE: SOMEWHAT DIFFICULT
7. FEELING AFRAID AS IF SOMETHING AWFUL MIGHT HAPPEN: MORE THAN HALF THE DAYS
2. NOT BEING ABLE TO STOP OR CONTROL WORRYING: SEVERAL DAYS
4. TROUBLE RELAXING: MORE THAN HALF THE DAYS
GAD7 TOTAL SCORE: 11
6. BECOMING EASILY ANNOYED OR IRRITABLE: MORE THAN HALF THE DAYS
3. WORRYING TOO MUCH ABOUT DIFFERENT THINGS: MORE THAN HALF THE DAYS

## 2024-08-01 NOTE — PATIENT INSTRUCTIONS
Plan:  Dakotah Bertrand, it was nice seeing you today  Stop Wellbutrin  Start clonidine 0.1mg twice daily. Take second dose when you get home from work. Monitor for side effects including but not limited to lightheadedness and dizziness  Continue Lexapro 20mg daily  Follow up in 4-6 weeks      -Please take medications as prescribed  -Refrain from alcohol or drug use  -Seek emergency help via the emergency and/or calling 911 should symptoms become severe, worsen, or with other concerning symptoms.Go immediately to the emergency room and/or call 911 with any suicidal or homicidal ideations or if audio/visual hallucinations develop.   -Contact office with any questions or concerns.      Crisis phone numbers:  988-national suicide hotline, call or text  [facility] [place], and [facility] [phone].  [facility] [phone]  [facility] [phone].  [facility] [phone].  [facility] [phone].  [facility] [phone].

## 2024-08-01 NOTE — PROGRESS NOTES
[facility] [place] [facility]  [facility] PSYCHIATRY  [address] [facility] [place] [id number] [phone]    Progress Note    Patient:  Daktoah Bertrand  YOB: 1975  PCP:  Karl Lima APRN - CNP  Visit Date:  8/1/2024      CC: Follow up for medication management and psychotherapy      SUBJECTIVE:      Dakotah Bertrand, a 48 y.o. male, presents for a follow up visit.  Reports he stopped Wellbutrin due to feeling as though is making symptoms worse.  Reports he felt more fatigued.  Also reports feeling numb.  Describes some increased irritability as well.  Reports his wife had been on in the past and felt numb so he did not want to continue.  He is back to work now.  Is feeling more tired.  Reports issues with concentration when he gets home from work.  Is concerned about his ability to focus.  Describes what sounds like a lot of anxiety when he gets home due to feeling overwhelmed.  Can lead to some irritability as well.  Reports he feels depleted and has a hard time managing home activities.  He denies suicidal or homicidal ideation, intent or plan.  Appetite stable.  Confirms continued use of CPAP.    16 minutes spent in supportive psychotherapy processing through stressors, anxiety and concentration issues.          OBJECTIVE:      6/26/2024     6:51 AM   Patient-Reported Vitals   Patient-Reported Weight 455   Patient-Reported Height 6'5\"   Patient-Reported Systolic 135 mmHg   Patient-Reported Diastolic 82 mmHg   Patient-Reported Pulse 85   Patient-Reported Temperature 97.7          MENTAL STATUS EXAM:  Orientation: Alert, oriented, thought content appropriate   Mood:. \"The same\"      Affect:  Normal      Appearance:  Age Appropriate, Clothing Appropriate for Age, and Clothing Appropriate for Weather   Thought Process:  Within Normal Limits   Thought Content:  Within Normal Limits   Insight:  Fair   Judgment: Age Appropriate   Suicidal Ideations: Denies

## 2024-08-08 DIAGNOSIS — J32.4 CHRONIC PANSINUSITIS: ICD-10-CM

## 2024-08-08 RX ORDER — CLOTRIMAZOLE AND BETAMETHASONE DIPROPIONATE 10; .64 MG/G; MG/G
CREAM TOPICAL
Qty: 45 G | Refills: 3 | Status: SHIPPED | OUTPATIENT
Start: 2024-08-08

## 2024-08-08 RX ORDER — FLUTICASONE PROPIONATE 50 MCG
2 SPRAY, SUSPENSION (ML) NASAL DAILY
Qty: 48 G | Refills: 3 | Status: SHIPPED | OUTPATIENT
Start: 2024-08-08

## 2024-08-08 NOTE — TELEPHONE ENCOUNTER
Dakotah Bertrand called requesting a refill on the following medications:  Requested Prescriptions     Pending Prescriptions Disp Refills    clotrimazole-betamethasone (LOTRISONE) 1-0.05 % cream 45 g 3     Sig: Apply topically 2 times daily.     Pharmacy verified:  [email or web address]      Date of last visit: 05/15/2024  Date of next visit (if applicable): 11/15/2024

## 2024-08-22 ENCOUNTER — TELEPHONE (OUTPATIENT)
Dept: PSYCHIATRY | Age: 49
End: 2024-08-22

## 2024-08-22 NOTE — TELEPHONE ENCOUNTER
Ja wrote into the office via [employer]:     I've stopped taking the clonadine. It makes me sleepy a lot. And I feel like a zombie.    Please advise anything further. He is scheduled to return on 09/12/24.

## 2024-08-22 NOTE — TELEPHONE ENCOUNTER
I wrote back to Ja with this information via [facility].

## 2024-09-12 ENCOUNTER — OFFICE VISIT (OUTPATIENT)
Dept: PSYCHIATRY | Age: 49
End: 2024-09-12

## 2024-09-12 VITALS — DIASTOLIC BLOOD PRESSURE: 72 MMHG | SYSTOLIC BLOOD PRESSURE: 140 MMHG | HEART RATE: 70 BPM

## 2024-09-12 DIAGNOSIS — F41.1 GAD (GENERALIZED ANXIETY DISORDER): ICD-10-CM

## 2024-09-12 DIAGNOSIS — F33.1 MDD (MAJOR DEPRESSIVE DISORDER), RECURRENT EPISODE, MODERATE (HCC): ICD-10-CM

## 2024-09-12 DIAGNOSIS — G47.33 OSA (OBSTRUCTIVE SLEEP APNEA): ICD-10-CM

## 2024-09-12 DIAGNOSIS — R41.840 CONCENTRATION DEFICIT: ICD-10-CM

## 2024-09-12 DIAGNOSIS — G47.10 HYPERSOMNIA: Primary | ICD-10-CM

## 2024-09-12 RX ORDER — MODAFINIL 100 MG/1
100 TABLET ORAL DAILY
Qty: 30 TABLET | Refills: 0 | Status: SHIPPED | OUTPATIENT
Start: 2024-09-12 | End: 2024-10-12

## 2024-09-12 ASSESSMENT — PATIENT HEALTH QUESTIONNAIRE - PHQ9
10. IF YOU CHECKED OFF ANY PROBLEMS, HOW DIFFICULT HAVE THESE PROBLEMS MADE IT FOR YOU TO DO YOUR WORK, TAKE CARE OF THINGS AT HOME, OR GET ALONG WITH OTHER PEOPLE: SOMEWHAT DIFFICULT
7. TROUBLE CONCENTRATING ON THINGS, SUCH AS READING THE NEWSPAPER OR WATCHING TELEVISION: NEARLY EVERY DAY
5. POOR APPETITE OR OVEREATING: MORE THAN HALF THE DAYS
SUM OF ALL RESPONSES TO PHQ9 QUESTIONS 1 & 2: 3
SUM OF ALL RESPONSES TO PHQ QUESTIONS 1-9: 17
9. THOUGHTS THAT YOU WOULD BE BETTER OFF DEAD, OR OF HURTING YOURSELF: SEVERAL DAYS
SUM OF ALL RESPONSES TO PHQ QUESTIONS 1-9: 18
8. MOVING OR SPEAKING SO SLOWLY THAT OTHER PEOPLE COULD HAVE NOTICED. OR THE OPPOSITE, BEING SO FIGETY OR RESTLESS THAT YOU HAVE BEEN MOVING AROUND A LOT MORE THAN USUAL: NEARLY EVERY DAY
2. FEELING DOWN, DEPRESSED OR HOPELESS: SEVERAL DAYS
1. LITTLE INTEREST OR PLEASURE IN DOING THINGS: MORE THAN HALF THE DAYS
6. FEELING BAD ABOUT YOURSELF - OR THAT YOU ARE A FAILURE OR HAVE LET YOURSELF OR YOUR FAMILY DOWN: MORE THAN HALF THE DAYS
3. TROUBLE FALLING OR STAYING ASLEEP: MORE THAN HALF THE DAYS
SUM OF ALL RESPONSES TO PHQ QUESTIONS 1-9: 18
SUM OF ALL RESPONSES TO PHQ QUESTIONS 1-9: 18
4. FEELING TIRED OR HAVING LITTLE ENERGY: MORE THAN HALF THE DAYS

## 2024-09-12 ASSESSMENT — ANXIETY QUESTIONNAIRES
7. FEELING AFRAID AS IF SOMETHING AWFUL MIGHT HAPPEN: MORE THAN HALF THE DAYS
IF YOU CHECKED OFF ANY PROBLEMS ON THIS QUESTIONNAIRE, HOW DIFFICULT HAVE THESE PROBLEMS MADE IT FOR YOU TO DO YOUR WORK, TAKE CARE OF THINGS AT HOME, OR GET ALONG WITH OTHER PEOPLE: VERY DIFFICULT
4. TROUBLE RELAXING: MORE THAN HALF THE DAYS
2. NOT BEING ABLE TO STOP OR CONTROL WORRYING: SEVERAL DAYS
1. FEELING NERVOUS, ANXIOUS, OR ON EDGE: MORE THAN HALF THE DAYS
GAD7 TOTAL SCORE: 11
5. BEING SO RESTLESS THAT IT IS HARD TO SIT STILL: SEVERAL DAYS
3. WORRYING TOO MUCH ABOUT DIFFERENT THINGS: MORE THAN HALF THE DAYS
6. BECOMING EASILY ANNOYED OR IRRITABLE: SEVERAL DAYS

## 2024-09-12 ASSESSMENT — COLUMBIA-SUICIDE SEVERITY RATING SCALE - C-SSRS
1. WITHIN THE PAST MONTH, HAVE YOU WISHED YOU WERE DEAD OR WISHED YOU COULD GO TO SLEEP AND NOT WAKE UP?: YES
6. HAVE YOU EVER DONE ANYTHING, STARTED TO DO ANYTHING, OR PREPARED TO DO ANYTHING TO END YOUR LIFE?: NO
2. HAVE YOU ACTUALLY HAD ANY THOUGHTS OF KILLING YOURSELF?: NO

## 2024-09-23 ENCOUNTER — OFFICE VISIT (OUTPATIENT)
Dept: PULMONOLOGY | Age: 49
End: 2024-09-23
Payer: MEDICAID

## 2024-09-23 VITALS
DIASTOLIC BLOOD PRESSURE: 88 MMHG | OXYGEN SATURATION: 98 % | HEIGHT: 77 IN | TEMPERATURE: 97.8 F | BODY MASS INDEX: 37.19 KG/M2 | SYSTOLIC BLOOD PRESSURE: 138 MMHG | HEART RATE: 70 BPM | WEIGHT: 315 LBS

## 2024-09-23 DIAGNOSIS — G47.33 OSA ON CPAP: Primary | ICD-10-CM

## 2024-09-23 DIAGNOSIS — E66.01 MORBID OBESITY WITH BMI OF 50.0-59.9, ADULT: ICD-10-CM

## 2024-09-23 PROCEDURE — 99214 OFFICE O/P EST MOD 30 MIN: CPT | Performed by: NURSE PRACTITIONER

## 2024-09-23 ASSESSMENT — ENCOUNTER SYMPTOMS
RESPIRATORY NEGATIVE: 1
WHEEZING: 0
EYES NEGATIVE: 1
NAUSEA: 0
DIARRHEA: 0
CHEST TIGHTNESS: 0
GASTROINTESTINAL NEGATIVE: 1
STRIDOR: 0
ALLERGIC/IMMUNOLOGIC NEGATIVE: 1
VOMITING: 0

## 2024-09-26 ENCOUNTER — TELEPHONE (OUTPATIENT)
Dept: PSYCHIATRY | Age: 49
End: 2024-09-26

## 2024-10-15 DIAGNOSIS — J32.4 CHRONIC PANSINUSITIS: ICD-10-CM

## 2024-10-15 DIAGNOSIS — G47.10 HYPERSOMNIA: ICD-10-CM

## 2024-10-15 RX ORDER — FLUTICASONE PROPIONATE 50 MCG
2 SPRAY, SUSPENSION (ML) NASAL DAILY
Qty: 48 G | Refills: 3 | Status: SHIPPED | OUTPATIENT
Start: 2024-10-15

## 2024-10-16 RX ORDER — MODAFINIL 100 MG/1
100 TABLET ORAL 2 TIMES DAILY
Qty: 60 TABLET | Refills: 0 | OUTPATIENT
Start: 2024-10-16 | End: 2024-11-15

## 2024-10-21 ENCOUNTER — TELEPHONE (OUTPATIENT)
Dept: PSYCHIATRY | Age: 49
End: 2024-10-21

## 2024-10-21 NOTE — TELEPHONE ENCOUNTER
Ja wrote into the office via [employer]; he labeled the message Modafinil and Lexapro.     I've been experiencing anxiety and bitterness. Almost panic attacks while on both medications. I'm reading that I should not be taking both of these medicines together. Is there an alternative to lexipro or should I stop lexipro altogether and just use modafinil.     Please advise. Scheduled to return on 10/24/24. Last seen on 09/12/24.

## 2024-10-21 NOTE — TELEPHONE ENCOUNTER
Ja,    Both of these medications can impact serotonin and the concern is too much serotonin, though the risk is on lower side. It is difficult to tease out if Modafinil is driving anxiety as it is a stimulant. Since you have had benefit from Modafinil I would recommend you decrease Lexapro to 10mg daily and continue with current modafinil dosing. Update me in 2 weeks on how you are doing. I have also included signs/symptoms of too much serotonin; let me know if you are having these.    Too much serotonin can cause signs and symptoms including agitation, restlessness, insomnia (trouble sleeping), confusion, high heart rate, high blood pressure, dilated pupils, twitching muscles, excessive sweating or goosebumps.  Severely high serotonin can cause irregular heartbeat, unconsciousness, high fever as well as seizures.  Monitor closely for any symptoms and if they present please reach out immediately or go to the emergency department.      Dr. Victor

## 2024-10-24 ENCOUNTER — OFFICE VISIT (OUTPATIENT)
Dept: PSYCHIATRY | Age: 49
End: 2024-10-24

## 2024-10-24 VITALS — SYSTOLIC BLOOD PRESSURE: 140 MMHG | DIASTOLIC BLOOD PRESSURE: 80 MMHG | HEART RATE: 64 BPM

## 2024-10-24 DIAGNOSIS — F41.1 GAD (GENERALIZED ANXIETY DISORDER): Primary | ICD-10-CM

## 2024-10-24 DIAGNOSIS — G47.10 HYPERSOMNIA: ICD-10-CM

## 2024-10-24 DIAGNOSIS — F33.0 MILD EPISODE OF RECURRENT MAJOR DEPRESSIVE DISORDER (HCC): ICD-10-CM

## 2024-10-24 DIAGNOSIS — R41.840 CONCENTRATION DEFICIT: ICD-10-CM

## 2024-10-24 RX ORDER — MODAFINIL 100 MG/1
100 TABLET ORAL DAILY
Qty: 30 TABLET | Refills: 0
Start: 2024-10-24 | End: 2024-11-23

## 2024-10-24 ASSESSMENT — ANXIETY QUESTIONNAIRES
7. FEELING AFRAID AS IF SOMETHING AWFUL MIGHT HAPPEN: NOT AT ALL
6. BECOMING EASILY ANNOYED OR IRRITABLE: NOT AT ALL
1. FEELING NERVOUS, ANXIOUS, OR ON EDGE: SEVERAL DAYS
5. BEING SO RESTLESS THAT IT IS HARD TO SIT STILL: MORE THAN HALF THE DAYS
4. TROUBLE RELAXING: SEVERAL DAYS
IF YOU CHECKED OFF ANY PROBLEMS ON THIS QUESTIONNAIRE, HOW DIFFICULT HAVE THESE PROBLEMS MADE IT FOR YOU TO DO YOUR WORK, TAKE CARE OF THINGS AT HOME, OR GET ALONG WITH OTHER PEOPLE: NOT DIFFICULT AT ALL
GAD7 TOTAL SCORE: 5
2. NOT BEING ABLE TO STOP OR CONTROL WORRYING: NOT AT ALL
3. WORRYING TOO MUCH ABOUT DIFFERENT THINGS: SEVERAL DAYS

## 2024-10-24 ASSESSMENT — PATIENT HEALTH QUESTIONNAIRE - PHQ9
7. TROUBLE CONCENTRATING ON THINGS, SUCH AS READING THE NEWSPAPER OR WATCHING TELEVISION: SEVERAL DAYS
5. POOR APPETITE OR OVEREATING: SEVERAL DAYS
SUM OF ALL RESPONSES TO PHQ9 QUESTIONS 1 & 2: 1
SUM OF ALL RESPONSES TO PHQ QUESTIONS 1-9: 6
9. THOUGHTS THAT YOU WOULD BE BETTER OFF DEAD, OR OF HURTING YOURSELF: NOT AT ALL
1. LITTLE INTEREST OR PLEASURE IN DOING THINGS: SEVERAL DAYS
SUM OF ALL RESPONSES TO PHQ QUESTIONS 1-9: 6
4. FEELING TIRED OR HAVING LITTLE ENERGY: SEVERAL DAYS
6. FEELING BAD ABOUT YOURSELF - OR THAT YOU ARE A FAILURE OR HAVE LET YOURSELF OR YOUR FAMILY DOWN: NOT AT ALL
8. MOVING OR SPEAKING SO SLOWLY THAT OTHER PEOPLE COULD HAVE NOTICED. OR THE OPPOSITE, BEING SO FIGETY OR RESTLESS THAT YOU HAVE BEEN MOVING AROUND A LOT MORE THAN USUAL: SEVERAL DAYS
3. TROUBLE FALLING OR STAYING ASLEEP: SEVERAL DAYS
SUM OF ALL RESPONSES TO PHQ QUESTIONS 1-9: 6
10. IF YOU CHECKED OFF ANY PROBLEMS, HOW DIFFICULT HAVE THESE PROBLEMS MADE IT FOR YOU TO DO YOUR WORK, TAKE CARE OF THINGS AT HOME, OR GET ALONG WITH OTHER PEOPLE: NOT DIFFICULT AT ALL
2. FEELING DOWN, DEPRESSED OR HOPELESS: NOT AT ALL
SUM OF ALL RESPONSES TO PHQ QUESTIONS 1-9: 6

## 2024-10-24 NOTE — PROGRESS NOTES
[facility] [place] [facility]  [facility] PSYCHIATRY  [address] [address] [phone]    Progress Note    Patient:  Dakotah Bertrand  YOB: 1975  PCP:  Karl Lima APRN - CNP  Visit Date:  10/24/2024      CC: Follow up for medication management for    Diagnosis Orders   1. JOE (generalized anxiety disorder)        2. Mild episode of recurrent major depressive disorder (HCC)        3. Hypersomnia  modafinil (PROVIGIL) 100 MG tablet      4. Concentration deficit            SUBJECTIVE:      Dakotah Bertrand, a 49 y.o. male, presents for a follow up visit. Reports doing okay today. He never increased Modafinil and feels 100mg daily has been helpful with fatigue and concentration. He had reached out a few days ago about increased anxiety and panic. Reports this has improved and likely related to some stress with his daughter. No evidence of serotonin syndrome today. He continued with Lexapro 20mg daily. Reports depression well controlled. Denies suicidal or homicidal ideation, intent or plan. Appetite and sleep stable. Using PAP consistently. Work going well.    16 minutes spent in supportive psychotherapy. Focus was on stressors.      OBJECTIVE:  Vitals:    10/24/24 0854   BP: (!) 140/80   Pulse: 64   Denied CP, vision changes, palpitations or headaches.      MENTAL STATUS EXAM:  Orientation: Alert, oriented, thought content appropriate   Mood:. \"Good\"      Affect:  euthymic      Appearance:  Age Appropriate, Clothing Appropriate for Age, and Clothing Appropriate for Weather   Thought Process:  Within Normal Limits   Thought Content:  Within Normal Limits   Insight:  Fair   Judgment: Age Appropriate   Suicidal Ideations: Denies               10/24/2024     8:36 AM   PHQ-9    Little interest or pleasure in doing things 1   Feeling down, depressed, or hopeless 0   Trouble falling or staying asleep, or sleeping too much 1   Feeling tired or

## 2024-10-24 NOTE — PATIENT INSTRUCTIONS
Plan:  Dakotah Bertrand, it was nice seeing you today  Continue Modafinil 100mg daily. Monitor for side effects  Continue Lexapro 20mg daily  Follow up in 3 months but reach out sooner if needed      -Please take medications as prescribed  -Refrain from alcohol or drug use  -Seek emergency help via the emergency and/or calling 911 should symptoms become severe, worsen, or with other concerning symptoms.Go immediately to the emergency room and/or call 911 with any suicidal or homicidal ideations or if audio/visual hallucinations develop.   -Contact office with any questions or concerns.      Crisis phone numbers:  988-national suicide hotline, call or text  [facility] [phone].  [facility] [phone]  [facility] [phone].  [facility] [phone].  [facility] [phone].  [facility] [phone].

## 2024-11-18 DIAGNOSIS — G47.10 HYPERSOMNIA: ICD-10-CM

## 2024-11-18 RX ORDER — MODAFINIL 100 MG/1
100 TABLET ORAL DAILY
Qty: 30 TABLET | Refills: 2 | Status: SHIPPED | OUTPATIENT
Start: 2024-11-18 | End: 2025-02-16

## 2024-11-18 NOTE — TELEPHONE ENCOUNTER
Dakotah [facility] pharmacy is requesting a refill on the following medications:  Requested Prescriptions     Pending Prescriptions Disp Refills    modafinil (PROVIGIL) 100 MG tablet [Pharmacy Med Name: MODAFINIL 100MG TABLETS] 30 tablet 1     Sig: TAKE 1 TABLET BY MOUTH DAILY. MAX DAILY AMOUNT: 100 MG       Date of last visit: 10/24/2024  Date of next visit (if applicable):1/22/2025  Pharmacy Name: Wen Levi MA

## 2025-02-03 DIAGNOSIS — F41.1 GAD (GENERALIZED ANXIETY DISORDER): ICD-10-CM

## 2025-02-03 RX ORDER — ESCITALOPRAM OXALATE 20 MG/1
20 TABLET ORAL DAILY
Qty: 90 TABLET | Refills: 1 | Status: SHIPPED | OUTPATIENT
Start: 2025-02-03

## 2025-02-03 NOTE — TELEPHONE ENCOUNTER
Jahaira is requesting a medication refill on Ja's behalf for Lexapro 20mg;#90 with 1 refill;last with a start date of 08/01/24 and last refill date of 11/18/24.    Medication is pending your approval for a 90 day supply with 1 refill; he is scheduled to return on 02/21/25. Last seen on 10/24/24.

## 2025-02-03 NOTE — TELEPHONE ENCOUNTER
Left patient a message checking to see if he has enough medication to get him thru until his appointment on 02/27/25.

## 2025-02-20 ENCOUNTER — OFFICE VISIT (OUTPATIENT)
Dept: FAMILY MEDICINE CLINIC | Age: 50
End: 2025-02-20

## 2025-02-20 VITALS
WEIGHT: 315 LBS | DIASTOLIC BLOOD PRESSURE: 82 MMHG | TEMPERATURE: 99.6 F | HEART RATE: 88 BPM | BODY MASS INDEX: 54.03 KG/M2 | SYSTOLIC BLOOD PRESSURE: 120 MMHG | RESPIRATION RATE: 16 BRPM

## 2025-02-20 DIAGNOSIS — E66.01 MORBID OBESITY WITH BMI OF 50.0-59.9, ADULT: ICD-10-CM

## 2025-02-20 DIAGNOSIS — J10.1 INFLUENZA A: Primary | ICD-10-CM

## 2025-02-20 DIAGNOSIS — J40 BRONCHITIS: ICD-10-CM

## 2025-02-20 RX ORDER — AZITHROMYCIN 250 MG/1
TABLET, FILM COATED ORAL
Qty: 6 TABLET | Refills: 0 | Status: SHIPPED | OUTPATIENT
Start: 2025-02-20 | End: 2025-03-02

## 2025-02-20 RX ORDER — MODAFINIL 100 MG/1
TABLET ORAL
COMMUNITY
Start: 2025-02-11

## 2025-02-20 RX ORDER — OSELTAMIVIR PHOSPHATE 75 MG/1
75 CAPSULE ORAL 2 TIMES DAILY
Qty: 10 CAPSULE | Refills: 0 | Status: SHIPPED | OUTPATIENT
Start: 2025-02-20 | End: 2025-02-25

## 2025-02-20 RX ORDER — ALBUTEROL SULFATE 90 UG/1
2 INHALANT RESPIRATORY (INHALATION) 4 TIMES DAILY PRN
Qty: 18 G | Refills: 1 | Status: SHIPPED | OUTPATIENT
Start: 2025-02-20

## 2025-02-20 SDOH — ECONOMIC STABILITY: FOOD INSECURITY: WITHIN THE PAST 12 MONTHS, THE FOOD YOU BOUGHT JUST DIDN'T LAST AND YOU DIDN'T HAVE MONEY TO GET MORE.: NEVER TRUE

## 2025-02-20 SDOH — ECONOMIC STABILITY: FOOD INSECURITY: WITHIN THE PAST 12 MONTHS, YOU WORRIED THAT YOUR FOOD WOULD RUN OUT BEFORE YOU GOT MONEY TO BUY MORE.: NEVER TRUE

## 2025-02-20 ASSESSMENT — ENCOUNTER SYMPTOMS
SHORTNESS OF BREATH: 1
COUGH: 1
SINUS PRESSURE: 1
SORE THROAT: 1
NAUSEA: 0
ABDOMINAL PAIN: 0
RHINORRHEA: 1
CHEST TIGHTNESS: 1

## 2025-02-20 ASSESSMENT — PATIENT HEALTH QUESTIONNAIRE - PHQ9
1. LITTLE INTEREST OR PLEASURE IN DOING THINGS: NOT AT ALL
2. FEELING DOWN, DEPRESSED OR HOPELESS: NOT AT ALL
SUM OF ALL RESPONSES TO PHQ QUESTIONS 1-9: 8
4. FEELING TIRED OR HAVING LITTLE ENERGY: NOT AT ALL
7. TROUBLE CONCENTRATING ON THINGS, SUCH AS READING THE NEWSPAPER OR WATCHING TELEVISION: NEARLY EVERY DAY
3. TROUBLE FALLING OR STAYING ASLEEP: NOT AT ALL
9. THOUGHTS THAT YOU WOULD BE BETTER OFF DEAD, OR OF HURTING YOURSELF: SEVERAL DAYS
5. POOR APPETITE OR OVEREATING: NOT AT ALL
SUM OF ALL RESPONSES TO PHQ QUESTIONS 1-9: 7
SUM OF ALL RESPONSES TO PHQ QUESTIONS 1-9: 8
6. FEELING BAD ABOUT YOURSELF - OR THAT YOU ARE A FAILURE OR HAVE LET YOURSELF OR YOUR FAMILY DOWN: SEVERAL DAYS
10. IF YOU CHECKED OFF ANY PROBLEMS, HOW DIFFICULT HAVE THESE PROBLEMS MADE IT FOR YOU TO DO YOUR WORK, TAKE CARE OF THINGS AT HOME, OR GET ALONG WITH OTHER PEOPLE: SOMEWHAT DIFFICULT
8. MOVING OR SPEAKING SO SLOWLY THAT OTHER PEOPLE COULD HAVE NOTICED. OR THE OPPOSITE, BEING SO FIGETY OR RESTLESS THAT YOU HAVE BEEN MOVING AROUND A LOT MORE THAN USUAL: NEARLY EVERY DAY
SUM OF ALL RESPONSES TO PHQ9 QUESTIONS 1 & 2: 0
SUM OF ALL RESPONSES TO PHQ QUESTIONS 1-9: 8

## 2025-02-20 ASSESSMENT — COLUMBIA-SUICIDE SEVERITY RATING SCALE - C-SSRS
2. HAVE YOU ACTUALLY HAD ANY THOUGHTS OF KILLING YOURSELF?: NO
6. HAVE YOU EVER DONE ANYTHING, STARTED TO DO ANYTHING, OR PREPARED TO DO ANYTHING TO END YOUR LIFE?: NO
1. WITHIN THE PAST MONTH, HAVE YOU WISHED YOU WERE DEAD OR WISHED YOU COULD GO TO SLEEP AND NOT WAKE UP?: NO

## 2025-02-20 NOTE — PROGRESS NOTES
Dakotah Bertrand (1975) 49 y.o. male here for evaluation of the following chief complaint(s):      HPI:  Chief Complaint   Patient presents with    Other     Wife is in the hospital right now with influenza A and pneumonia    Chills     Pt has chills, sweats and feeling weak. Lots of fatigue    Headache     Sinus HA    Sore Throat    Chest Congestion     Has some coughing, not bad though    Letter for School/Work     Pt says he has not been to work since Monday        Wife has FLU A and PNA and currently admitted.     Patient with symptoms as above.  Onset of 3 days.   SOB at times.  Burning sensation in chest with cough and congestion.       OTC: Dayquil    Vitals:    02/20/25 1123   BP: 120/82   Pulse: 88   Resp: 16   Temp: 99.6 °F (37.6 °C)       Patient Active Problem List   Diagnosis    Morbid obesity with BMI of 50.0-59.9, adult    NATACHA on CPAP       SUBJECTIVE/OBJECTIVE:  Review of Systems   Constitutional:  Positive for activity change, appetite change, chills, fatigue and fever.   HENT:  Positive for congestion, postnasal drip, rhinorrhea, sinus pressure and sore throat.    Respiratory:  Positive for cough, chest tightness and shortness of breath.    Cardiovascular:  Negative for chest pain.   Gastrointestinal:  Negative for abdominal pain and nausea.   Musculoskeletal:  Positive for myalgias.   Skin:  Negative for rash.   Neurological:  Positive for headaches. Negative for dizziness and light-headedness.   Psychiatric/Behavioral: Negative.         Physical Exam  Constitutional:       General: He is not in acute distress.     Appearance: He is ill-appearing.   HENT:      Nose: Mucosal edema, congestion and rhinorrhea present.      Right Sinus: Maxillary sinus tenderness present.      Left Sinus: Maxillary sinus tenderness present.      Mouth/Throat:      Pharynx: Postnasal drip present. No pharyngeal swelling.   Eyes:      Pupils: Pupils are equal, round, and reactive to light.   Cardiovascular:

## 2025-02-26 ENCOUNTER — TELEMEDICINE (OUTPATIENT)
Dept: PSYCHIATRY | Age: 50
End: 2025-02-26
Payer: MEDICAID

## 2025-02-26 DIAGNOSIS — F33.41 RECURRENT MAJOR DEPRESSIVE DISORDER, IN PARTIAL REMISSION: ICD-10-CM

## 2025-02-26 DIAGNOSIS — G47.10 HYPERSOMNIA: ICD-10-CM

## 2025-02-26 DIAGNOSIS — F41.1 GAD (GENERALIZED ANXIETY DISORDER): Primary | ICD-10-CM

## 2025-02-26 PROCEDURE — 99214 OFFICE O/P EST MOD 30 MIN: CPT | Performed by: PSYCHIATRY & NEUROLOGY

## 2025-02-26 RX ORDER — HYDROXYZINE HYDROCHLORIDE 25 MG/1
25 TABLET, FILM COATED ORAL EVERY 8 HOURS PRN
Qty: 30 TABLET | Refills: 5 | Status: SHIPPED | OUTPATIENT
Start: 2025-02-26 | End: 2025-04-27

## 2025-02-26 NOTE — PROGRESS NOTES
[facility] [place] [facility]  [facility] PSYCHIATRY  [address] [facility] [place] [id number] [phone]    Progress Note    Patient:  Dakotah Bertrand  YOB: 1975  PCP:  Karl Lima APRN - CNP  Visit Date:  2/26/2025      CC: Follow up for medication management for    Diagnosis Orders   1. JOE (generalized anxiety disorder)  hydrOXYzine HCl (ATARAX) 25 MG tablet      2. Hypersomnia        3. Recurrent major depressive disorder, in partial remission              SUBJECTIVE:      Dakotah Bertrand, a 49 y.o. male, presents for a follow up visit.  He reports some recent stressors.  Reports wife was in the hospital with pneumonia and influenza A.  She is now doing better in home.  He also had influenza A.  He is starting to feel better.  He has been off medications for a few weeks now and feels a lot better in regards to anxiety, depression and fatigue..  He reports he would like to stay off medications as he does not feel Lexapro or modafinil provided much benefit.  He thinks Lexapro might have been causing some of the symptoms he was reporting.  He does continue to have some intermittent anxiety/irritability episodes but thinks these are improved.  He would like to consider an as needed medication for these.  Reports she is functioning well.  Denies most depressive symptoms.  Rates his depression and anxiety as 1 or 2 out of 10 with 10 being the worst.  He denies suicidal or homicidal ideation, intent or plan.          OBJECTIVE:      2/26/2025     7:58 AM   Patient-Reported Vitals   Patient-Reported Weight 455   Patient-Reported Height 6'5\"   Patient-Reported Systolic 125 mmHg   Patient-Reported Diastolic 80 mmHg   Patient-Reported Pulse 72   Patient-Reported Temperature 97.7          MENTAL STATUS EXAM:  Orientation: Alert, oriented, thought content appropriate   Mood:. \"Good\"      Affect:  Euthymic      Appearance:  Age Appropriate, Clothing Appropriate

## 2025-02-26 NOTE — PATIENT INSTRUCTIONS
Plan:  Dakotah Bertrand, it was nice seeing you today  You can try hydroxyzine 25 mg up to 3 times daily as needed for anxiety.  Monitor for side effects.  If using frequently I would recommend you reach out as we may need to readdress this plan  Follow-up in 6 months but reach out sooner if needed      -Please take medications as prescribed  -Refrain from alcohol or drug use  -Seek emergency help via the emergency and/or calling 911 should symptoms become severe, worsen, or with other concerning symptoms.Go immediately to the emergency room and/or call 911 with any suicidal or homicidal ideations or if audio/visual hallucinations develop.   -Contact office with any questions or concerns.      Crisis phone numbers:  988-national suicide hotline, call or text  [facility] [place], [facility] [phone].  [facility] [phone]  [facility] [phone].  [facility] [phone].  [facility] [phone].  [facility] [phone].

## 2025-03-17 ENCOUNTER — TELEPHONE (OUTPATIENT)
Dept: PSYCHIATRY | Age: 50
End: 2025-03-17

## 2025-03-17 NOTE — TELEPHONE ENCOUNTER
Ja wrote into the office via [employer]:    I'm not sure if the current medicine doesn't work well or it's not a high enough dosage. I have great days and I don't  need it, but I have days like today that I struggling greatly to be motivated at all. It's like I have days where I'm depressed and can't seem to shake it. Some days I'm off the rails with getting worked up and angry. Many days I'm hype and in a great mood. Those days are great.    Per chart; he was seen on 02/26/25 where he was started on Hydroxyzine 25mg every 8 hours/prn. Please advise. He is scheduled to return on 08/27/25.

## 2025-03-24 DIAGNOSIS — G47.10 HYPERSOMNIA: ICD-10-CM

## 2025-03-24 RX ORDER — MODAFINIL 100 MG/1
TABLET ORAL
Qty: 30 TABLET | OUTPATIENT
Start: 2025-03-24

## 2025-03-26 ENCOUNTER — TELEPHONE (OUTPATIENT)
Dept: PSYCHIATRY | Age: 50
End: 2025-03-26

## 2025-03-26 NOTE — TELEPHONE ENCOUNTER
Ja,    You have not provided a history consistent with bipolar disorder. We can discuss this and Narcolepsy at next visit. I think some of the executive dysfunction you are experiencing is related to hypersomnia, which can be a component of narcolepsy. I have attached a questionnaire below for you to answer. This will help screen for bipolar disorder.    Dr. Victor

## 2025-03-26 NOTE — TELEPHONE ENCOUNTER
Ja wrote into the office via "[employer]:    So I started taking the modafinil and it helps a lot. I'm finding out when my brain is tired I'm very irritable and have trouble controlling my emotions and outburst. I thought I might have bipolar disorder, but could I possibly have narcolepsy?     Please advise; he is scheduled Friday this week to further discuss as well. (Previously wrote in concerns and opted for a sooner appt).

## 2025-03-28 ENCOUNTER — TELEMEDICINE (OUTPATIENT)
Dept: PSYCHIATRY | Age: 50
End: 2025-03-28

## 2025-03-28 DIAGNOSIS — G47.10 HYPERSOMNIA: Primary | ICD-10-CM

## 2025-03-28 DIAGNOSIS — R45.4 IRRITABILITY: ICD-10-CM

## 2025-03-28 DIAGNOSIS — G47.33 OSA (OBSTRUCTIVE SLEEP APNEA): ICD-10-CM

## 2025-03-28 DIAGNOSIS — F33.0 MILD EPISODE OF RECURRENT MAJOR DEPRESSIVE DISORDER: ICD-10-CM

## 2025-03-28 RX ORDER — ARMODAFINIL 150 MG/1
150 TABLET ORAL DAILY
Qty: 30 TABLET | Refills: 2 | Status: SHIPPED | OUTPATIENT
Start: 2025-03-28 | End: 2025-06-26

## 2025-03-28 RX ORDER — MELOXICAM 7.5 MG/1
TABLET ORAL
COMMUNITY
Start: 2025-03-21

## 2025-03-28 ASSESSMENT — PATIENT HEALTH QUESTIONNAIRE - PHQ9
SUM OF ALL RESPONSES TO PHQ QUESTIONS 1-9: 10
2. FEELING DOWN, DEPRESSED OR HOPELESS: SEVERAL DAYS
SUM OF ALL RESPONSES TO PHQ QUESTIONS 1-9: 10
3. TROUBLE FALLING OR STAYING ASLEEP: SEVERAL DAYS
9. THOUGHTS THAT YOU WOULD BE BETTER OFF DEAD, OR OF HURTING YOURSELF: NOT AT ALL
1. LITTLE INTEREST OR PLEASURE IN DOING THINGS: MORE THAN HALF THE DAYS
7. TROUBLE CONCENTRATING ON THINGS, SUCH AS READING THE NEWSPAPER OR WATCHING TELEVISION: SEVERAL DAYS
5. POOR APPETITE OR OVEREATING: SEVERAL DAYS
2. FEELING DOWN, DEPRESSED OR HOPELESS: SEVERAL DAYS
7. TROUBLE CONCENTRATING ON THINGS, SUCH AS READING THE NEWSPAPER OR WATCHING TELEVISION: SEVERAL DAYS
4. FEELING TIRED OR HAVING LITTLE ENERGY: MORE THAN HALF THE DAYS
6. FEELING BAD ABOUT YOURSELF - OR THAT YOU ARE A FAILURE OR HAVE LET YOURSELF OR YOUR FAMILY DOWN: SEVERAL DAYS
3. TROUBLE FALLING OR STAYING ASLEEP: SEVERAL DAYS
1. LITTLE INTEREST OR PLEASURE IN DOING THINGS: MORE THAN HALF THE DAYS
10. IF YOU CHECKED OFF ANY PROBLEMS, HOW DIFFICULT HAVE THESE PROBLEMS MADE IT FOR YOU TO DO YOUR WORK, TAKE CARE OF THINGS AT HOME, OR GET ALONG WITH OTHER PEOPLE: SOMEWHAT DIFFICULT
SUM OF ALL RESPONSES TO PHQ QUESTIONS 1-9: 10
8. MOVING OR SPEAKING SO SLOWLY THAT OTHER PEOPLE COULD HAVE NOTICED. OR THE OPPOSITE, BEING SO FIGETY OR RESTLESS THAT YOU HAVE BEEN MOVING AROUND A LOT MORE THAN USUAL: SEVERAL DAYS
4. FEELING TIRED OR HAVING LITTLE ENERGY: MORE THAN HALF THE DAYS
6. FEELING BAD ABOUT YOURSELF - OR THAT YOU ARE A FAILURE OR HAVE LET YOURSELF OR YOUR FAMILY DOWN: SEVERAL DAYS
10. IF YOU CHECKED OFF ANY PROBLEMS, HOW DIFFICULT HAVE THESE PROBLEMS MADE IT FOR YOU TO DO YOUR WORK, TAKE CARE OF THINGS AT HOME, OR GET ALONG WITH OTHER PEOPLE: SOMEWHAT DIFFICULT
8. MOVING OR SPEAKING SO SLOWLY THAT OTHER PEOPLE COULD HAVE NOTICED. OR THE OPPOSITE - BEING SO FIDGETY OR RESTLESS THAT YOU HAVE BEEN MOVING AROUND A LOT MORE THAN USUAL: SEVERAL DAYS
9. THOUGHTS THAT YOU WOULD BE BETTER OFF DEAD, OR OF HURTING YOURSELF: NOT AT ALL
5. POOR APPETITE OR OVEREATING: SEVERAL DAYS

## 2025-03-28 NOTE — PATIENT INSTRUCTIONS
Plan:  Dakotah Bertrand, it was nice seeing you today  Stop Modafinil  Start Armodafinil 150mg daily. Monitor for side effects  [facility] to [facility] about setting up therapy. I will also have my office send a list of other resources  Follow up in 2 months      -Please take medications as prescribed  -Refrain from alcohol or drug use  -Seek emergency help via the emergency and/or calling 911 should symptoms become severe, worsen, or with other concerning symptoms.Go immediately to the emergency room and/or call 911 with any suicidal or homicidal ideations or if audio/visual hallucinations develop.   -Contact office with any questions or concerns.      Crisis phone numbers:  988-national suicide hotline, call or text  [facility], [facility], [facility] [phone].  [facility], [facility] [phone]  [facility] [phone].  [facility] [phone].  [facility] [phone].  [facility] [phone].

## 2025-03-28 NOTE — PROGRESS NOTES
[facility] [place] [facility]  [facility] PS[facility]  [address] [place] [address]  [phone]    Progress Note    Patient:  Dakotah Bertrand  YOB: 1975  PCP:  Karl Lima APRN - CNP  Visit Date:  3/28/2025      CC: Follow up for medication management for    Diagnosis Orders   1. Hypersomnia  Armodafinil (NUVIGIL) 150 MG TABS tablet      2. NATACHA (obstructive sleep apnea)  Armodafinil (NUVIGIL) 150 MG TABS tablet      3. Mild episode of recurrent major depressive disorder        4. Irritability                SUBJECTIVE:      Dakotah Bertrand, a 49 y.o. male, presents for a follow up visit.  Scheduled for an earlier appointment.  Reports restarting modafinil after experiencing more fatigue.  Reports he was noticing that when he was feeling more fatigued he was much more irritable and mood was more unstable.  Since restarting modafinil this has improved.  He has also been able to identify that his mood fluctuates depending on what he eats.  With modafinil he feels like it helps out for the first 4 hours or so and he has tried twice a day dosing but he feels like the results are a little bit up and down.  He had mentioned in a message some concerns about bipolar disorder and we reviewed for this today.  Rapid mood screener was negative and patient denied further history or symptoms consistent with this.  Does have history of volatile childhood with some trauma and endorses some symptoms related to PTSD but does not report symptoms consistent with full criteria.  He denies suicidal or homicidal ideation, intent or plan.    16 minutes spent in supportive psychotherapy.  Focus was on irritability, diet and fatigue.          OBJECTIVE:      2/26/2025     7:58 AM   Patient-Reported Vitals   Patient-Reported Weight 455   Patient-Reported Height 6'5\"   Patient-Reported Systolic 125 mmHg   Patient-Reported Diastolic 80 mmHg   Patient-Reported Pulse 72

## 2025-05-24 ENCOUNTER — HOSPITAL ENCOUNTER (EMERGENCY)
Age: 50
Discharge: HOME | End: 2025-05-24
Payer: MEDICAID

## 2025-05-24 VITALS
HEART RATE: 76 BPM | TEMPERATURE: 98.1 F | DIASTOLIC BLOOD PRESSURE: 96 MMHG | RESPIRATION RATE: 18 BRPM | SYSTOLIC BLOOD PRESSURE: 164 MMHG | BODY MASS INDEX: 54.55 KG/M2 | WEIGHT: 315 LBS | OXYGEN SATURATION: 97 %

## 2025-05-24 DIAGNOSIS — E66.813 CLASS 3 SEVERE OBESITY WITH SERIOUS COMORBIDITY AND BODY MASS INDEX (BMI) OF 50.0 TO 59.9 IN ADULT, UNSPECIFIED OBESITY TYPE (HCC): ICD-10-CM

## 2025-05-24 DIAGNOSIS — L03.116 CELLULITIS OF LEFT LOWER EXTREMITY: Primary | ICD-10-CM

## 2025-05-24 PROCEDURE — 99203 OFFICE O/P NEW LOW 30 MIN: CPT

## 2025-05-24 PROCEDURE — 6360000002 HC RX W HCPCS: Performed by: NURSE PRACTITIONER

## 2025-05-24 PROCEDURE — 99213 OFFICE O/P EST LOW 20 MIN: CPT

## 2025-05-24 PROCEDURE — 96372 THER/PROPH/DIAG INJ SC/IM: CPT

## 2025-05-24 RX ORDER — HYDROCODONE BITARTRATE AND ACETAMINOPHEN 5; 325 MG/1; MG/1
1 TABLET ORAL EVERY 6 HOURS PRN
Qty: 8 TABLET | Refills: 0 | Status: SHIPPED | OUTPATIENT
Start: 2025-05-24 | End: 2025-05-26

## 2025-05-24 RX ORDER — CLINDAMYCIN HYDROCHLORIDE 300 MG/1
300 CAPSULE ORAL 3 TIMES DAILY
Qty: 30 CAPSULE | Refills: 0 | Status: SHIPPED | OUTPATIENT
Start: 2025-05-24 | End: 2025-06-03

## 2025-05-24 RX ADMIN — LIDOCAINE HYDROCHLORIDE 1000 MG: 10 INJECTION, SOLUTION EPIDURAL; INFILTRATION; INTRACAUDAL; PERINEURAL at 15:48

## 2025-05-24 ASSESSMENT — ENCOUNTER SYMPTOMS
NAUSEA: 0
EYE REDNESS: 0
SHORTNESS OF BREATH: 0
VOMITING: 0
ABDOMINAL PAIN: 0
WHEEZING: 0
EYE DISCHARGE: 0
DIARRHEA: 0
RHINORRHEA: 0
TROUBLE SWALLOWING: 0
BACK PAIN: 0
CONSTIPATION: 0
ALLERGIC/IMMUNOLOGIC NEGATIVE: 1
SORE THROAT: 0
COLOR CHANGE: 1
COUGH: 0
EYE PAIN: 0

## 2025-05-24 ASSESSMENT — PAIN - FUNCTIONAL ASSESSMENT
PAIN_FUNCTIONAL_ASSESSMENT: PREVENTS OR INTERFERES SOME ACTIVE ACTIVITIES AND ADLS
PAIN_FUNCTIONAL_ASSESSMENT: 0-10

## 2025-05-24 ASSESSMENT — PAIN DESCRIPTION - LOCATION: LOCATION: LEG

## 2025-05-24 ASSESSMENT — PAIN DESCRIPTION - ONSET: ONSET: PROGRESSIVE

## 2025-05-24 ASSESSMENT — PAIN DESCRIPTION - DESCRIPTORS: DESCRIPTORS: NAGGING

## 2025-05-24 ASSESSMENT — PAIN DESCRIPTION - PAIN TYPE: TYPE: ACUTE PAIN

## 2025-05-24 ASSESSMENT — PAIN DESCRIPTION - ORIENTATION: ORIENTATION: LEFT;LOWER

## 2025-05-24 ASSESSMENT — PAIN SCALES - GENERAL: PAINLEVEL_OUTOF10: 3

## 2025-05-24 ASSESSMENT — PAIN DESCRIPTION - FREQUENCY: FREQUENCY: CONTINUOUS

## 2025-05-24 NOTE — ED PROVIDER NOTES
[facility] URGENT CARE  Urgent Care Encounter      CHIEF COMPLAINT       Chief Complaint   Patient presents with   • Cellulitis     Lower left leg,   swelling and redness       Nurses Notes reviewed and I agree except as noted in the HPI.  HISTORY OF PRESENT ILLNESS   Dakotah Bertrand is a 49 y.o. male who presents 3 days of worsening left lower leg erythema, edema and pain.  Patient states he had chills on the first day but has not had any longer.  Significant pain when standing.  Patient has had knee surgery about 2 years ago on his left knee and tends to have swelling due to poor circulation.  Works on his feet.    REVIEW OF SYSTEMS     Review of Systems   Constitutional:  Negative for activity change, fatigue and fever.   HENT:  Negative for congestion, ear pain, rhinorrhea, sore throat and trouble swallowing.    Eyes:  Negative for pain, discharge and redness.   Respiratory:  Negative for cough, shortness of breath and wheezing.    Cardiovascular: Negative.    Gastrointestinal:  Negative for abdominal pain, constipation, diarrhea, nausea and vomiting.   Endocrine: Negative.    Genitourinary:  Negative for dysuria, frequency and urgency.   Musculoskeletal:  Negative for arthralgias, back pain and myalgias.   Skin:  Positive for color change. Negative for rash.   Allergic/Immunologic: Negative.    Neurological:  Negative for dizziness, tremors, weakness and headaches.   Hematological: Negative.    Psychiatric/Behavioral:  Negative for dysphoric mood and sleep disturbance. The patient is not nervous/anxious.        PAST MEDICAL HISTORY         Diagnosis Date   • Anxiety    • Depression    • Hypertension    • Kidney stone        SURGICAL HISTORY     Patient  has a past surgical history that includes Cystocopy (Left, 03/14/2013) and knee surgery (Left, 12/09/2023).    CURRENT MEDICATIONS       Previous Medications    ALBUTEROL SULFATE HFA (VENTOLIN HFA) 108 (90 BASE) MCG/ACT INHALER    Inhale 2 puffs into

## 2025-06-09 ENCOUNTER — OFFICE VISIT (OUTPATIENT)
Dept: FAMILY MEDICINE CLINIC | Age: 50
End: 2025-06-09
Payer: MEDICAID

## 2025-06-09 VITALS
SYSTOLIC BLOOD PRESSURE: 132 MMHG | WEIGHT: 315 LBS | DIASTOLIC BLOOD PRESSURE: 84 MMHG | HEART RATE: 84 BPM | BODY MASS INDEX: 56.59 KG/M2 | RESPIRATION RATE: 20 BRPM

## 2025-06-09 DIAGNOSIS — G47.33 OSA ON CPAP: ICD-10-CM

## 2025-06-09 DIAGNOSIS — E66.01 MORBID OBESITY WITH BMI OF 50.0-59.9, ADULT (HCC): ICD-10-CM

## 2025-06-09 DIAGNOSIS — L03.116 LEFT LEG CELLULITIS: Primary | ICD-10-CM

## 2025-06-09 PROCEDURE — 99214 OFFICE O/P EST MOD 30 MIN: CPT | Performed by: NURSE PRACTITIONER

## 2025-06-09 RX ORDER — CLINDAMYCIN HYDROCHLORIDE 300 MG/1
300 CAPSULE ORAL 3 TIMES DAILY
Qty: 21 CAPSULE | Refills: 0 | Status: SHIPPED | OUTPATIENT
Start: 2025-06-09 | End: 2025-06-16

## 2025-06-09 ASSESSMENT — ENCOUNTER SYMPTOMS
ABDOMINAL PAIN: 0
COUGH: 0
SHORTNESS OF BREATH: 0
NAUSEA: 0

## 2025-06-09 NOTE — PROGRESS NOTES
Dakotah DOHERTY Elza (1975) 49 y.o. male here for evaluation of the following chief complaint(s):      HPI:  Chief Complaint   Patient presents with    Leg Swelling     Left leg redness       Seen UC 5/24 for cellulitis left leg.  Given Rocephin IM and Clindamycin 300 mg TID for 10 days.     Presents 2 weeks after dx and tx started.   Well improved leg swelling, redness and pain.   Continues with leg swelling off an on.  Stands at work all day - minimal movement      Patient has had knee surgery about 1.5 years ago on his left knee and tends to have swelling due to poor circulation.   Hx of DVT 1.5 years ago in left leg after knee surgery - was treated x 3 months    Vitals:    06/09/25 0924   BP: 132/84   Pulse: 84   Resp: 20         COLONOSCOPY - 2016    Patient Active Problem List   Diagnosis    Morbid obesity with BMI of 50.0-59.9, adult (HCC)    NATACHA on CPAP     Due for screening labs.     Lab Results   Component Value Date    LABA1C 6.0 07/05/2023    LABA1C 5.7 01/14/2022    LABA1C 5.4 08/26/2016     Lab Results   Component Value Date     07/05/2023       No components found for: \"CHLPL\"  Lab Results   Component Value Date    TRIG 109 01/14/2022    TRIG 96 08/26/2016    TRIG 128 03/11/2013     Lab Results   Component Value Date    HDL 42 01/14/2022    HDL 43 08/26/2016    HDL 39 03/11/2013     No components found for: \"LDLCALC\"  No components found for: \"LABVLDL\"      Chemistry        Component Value Date/Time     07/05/2023 1805    K 4.3 07/05/2023 1805     07/05/2023 1805    CO2 27 07/05/2023 1805    BUN 14 07/05/2023 1805    CREATININE 0.95 07/05/2023 1805        Component Value Date/Time    CALCIUM 9.30 07/05/2023 1805    ALKPHOS 63 01/14/2022 0955    AST 31 01/14/2022 0955    ALT 47 01/14/2022 0955    BILITOT 0.6 01/14/2022 0955            Lab Results   Component Value Date    TSH 1.630 01/14/2022       Lab Results   Component Value Date    WBC 7.1 07/05/2023    HGB 16.0 07/05/2023

## 2025-07-02 ENCOUNTER — PATIENT MESSAGE (OUTPATIENT)
Dept: FAMILY MEDICINE CLINIC | Age: 50
End: 2025-07-02

## 2025-07-02 DIAGNOSIS — G47.33 OSA (OBSTRUCTIVE SLEEP APNEA): ICD-10-CM

## 2025-07-02 DIAGNOSIS — G47.10 HYPERSOMNIA: ICD-10-CM

## 2025-07-02 RX ORDER — ARMODAFINIL 150 MG/1
150 TABLET ORAL DAILY
Qty: 30 TABLET | Refills: 2 | Status: SHIPPED | OUTPATIENT
Start: 2025-07-02 | End: 2025-09-30

## 2025-07-02 RX ORDER — CLOTRIMAZOLE AND BETAMETHASONE DIPROPIONATE 10; .64 MG/G; MG/G
CREAM TOPICAL
Qty: 45 G | Refills: 3 | OUTPATIENT
Start: 2025-07-02

## 2025-07-17 ENCOUNTER — PATIENT MESSAGE (OUTPATIENT)
Dept: FAMILY MEDICINE CLINIC | Age: 50
End: 2025-07-17

## 2025-07-17 DIAGNOSIS — M10.9 ACUTE GOUT, UNSPECIFIED CAUSE, UNSPECIFIED SITE: Primary | ICD-10-CM

## 2025-07-17 RX ORDER — COLCHICINE 0.6 MG/1
TABLET ORAL
Qty: 3 TABLET | Refills: 3 | Status: SHIPPED | OUTPATIENT
Start: 2025-07-17

## 2025-07-17 RX ORDER — PREDNISONE 50 MG/1
50 TABLET ORAL DAILY
Qty: 4 TABLET | Refills: 0 | Status: SHIPPED | OUTPATIENT
Start: 2025-07-17 | End: 2025-07-21

## 2025-07-18 ENCOUNTER — TELEPHONE (OUTPATIENT)
Dept: FAMILY MEDICINE CLINIC | Age: 50
End: 2025-07-18

## 2025-07-18 NOTE — TELEPHONE ENCOUNTER
Patient called in regarding gout. Wanted to know if you could give him a work note for 7/17/25 and 7/18/25. Plans on returning Monday if  better. Please advise. Patient requesting note through his MyChart. Please advise.

## 2025-07-21 ENCOUNTER — PATIENT MESSAGE (OUTPATIENT)
Dept: FAMILY MEDICINE CLINIC | Age: 50
End: 2025-07-21

## 2025-07-21 DIAGNOSIS — E66.01 MORBID OBESITY WITH BMI OF 50.0-59.9, ADULT (HCC): ICD-10-CM

## 2025-07-21 DIAGNOSIS — M10.9 ACUTE GOUT, UNSPECIFIED CAUSE, UNSPECIFIED SITE: Primary | ICD-10-CM

## 2025-07-25 ENCOUNTER — INITIAL CONSULT (OUTPATIENT)
Dept: BARIATRICS/WEIGHT MGMT | Age: 50
End: 2025-07-25
Payer: MEDICAID

## 2025-07-25 VITALS
BODY MASS INDEX: 39.17 KG/M2 | SYSTOLIC BLOOD PRESSURE: 144 MMHG | HEIGHT: 75 IN | DIASTOLIC BLOOD PRESSURE: 96 MMHG | HEART RATE: 72 BPM | TEMPERATURE: 98.1 F | WEIGHT: 315 LBS

## 2025-07-25 DIAGNOSIS — N20.0 NEPHROLITHIASIS: ICD-10-CM

## 2025-07-25 DIAGNOSIS — F32.A DEPRESSION, UNSPECIFIED DEPRESSION TYPE: ICD-10-CM

## 2025-07-25 DIAGNOSIS — F41.9 ANXIETY: ICD-10-CM

## 2025-07-25 DIAGNOSIS — G47.33 OSA ON CPAP: ICD-10-CM

## 2025-07-25 DIAGNOSIS — E66.01 MORBID OBESITY WITH BMI OF 50.0-59.9, ADULT (HCC): Primary | ICD-10-CM

## 2025-07-25 PROCEDURE — 99214 OFFICE O/P EST MOD 30 MIN: CPT | Performed by: SURGERY

## 2025-07-25 RX ORDER — HYDROXYZINE HYDROCHLORIDE 25 MG/1
25 TABLET, FILM COATED ORAL EVERY 8 HOURS PRN
COMMUNITY
Start: 2025-07-16

## 2025-07-25 RX ORDER — MELOXICAM 15 MG/1
15 TABLET ORAL DAILY
COMMUNITY
Start: 2025-07-03

## 2025-07-28 ENCOUNTER — OFFICE VISIT (OUTPATIENT)
Dept: BARIATRICS/WEIGHT MGMT | Age: 50
End: 2025-07-28

## 2025-07-28 VITALS — HEIGHT: 75 IN | BODY MASS INDEX: 39.17 KG/M2 | WEIGHT: 315 LBS

## 2025-07-28 DIAGNOSIS — Z01.818 PRE-OP TESTING: ICD-10-CM

## 2025-07-28 DIAGNOSIS — E66.01 MORBID OBESITY WITH BMI OF 50.0-59.9, ADULT (HCC): Primary | ICD-10-CM

## 2025-07-28 DIAGNOSIS — Z13.21 SCREENING FOR MALNUTRITION: ICD-10-CM

## 2025-07-28 NOTE — PROGRESS NOTES
Patient is a 49 y.o. male seen for   initial  MNT visit for  pre op bariatric surgery desires sleeve      Last Surgical Weight Loss:      7/28/2025     8:54 AM 11/19/2021    11:11 AM   Surgical Weight Loss Tracker   Date 7/28/2025 11/19/2021   Visit Type  New Consult    Visit Type Comment RD initial visit    Height 6' 3\" 6' 4\"   Initial Weight 479 lb 12.8 oz 441 lb 12.8 oz   Initial BMI 60 53.77   Ideal Body Weight 196 lb    Initial Excess Body Weight (EBW) 283 lb          (+)Sleep apnea - has CPAP machine    Patient is taking a Multivitamin daily:  Taking pill MVI once a day  Armodafinil - states last couple days has not taken this- prescribed by PCP as no longer seeing Dr Victor  Describe your current weight: reports increase fatigue during the day.  Knee surgery surgery couple years and off work for 8 months  and returning to work increase pain. How does your weight affect your daily activities? concern over medical problems, lack of energy .    Graduated high school at 320 lbs  Patient's lowest adult weight was 350 lbs at age early 20's.  The lowest weight was achieved through younger age, prior to getting .     Patient's highest adult weight was 479 lbs at age 49.  Patient was at his highest weight for 1 year    Patient has participated in the following weight loss programs: Keto times two     Patient has participated in meal replacement/liquid diets.    Patient has not participated in weight loss medications.    Patient is not lactose intolerant.  Patient does not have Zoroastrianism/cultural food concerns.  Patient does not have food allergies.    Patient dines out to a sit down restaurant 1 times per month.  Patient has fast food, carry out, or door dash 1 time per every other week.     Pt, spouse and six kids in house.  Working second shift 2:30p-11pm M-F  Sleep Habits: Goes to bed around ~ 1am and wakes up 9:30a-10am  Grocery Shopping in household done by: spouse  Cooking in household done by:

## 2025-07-28 NOTE — PATIENT INSTRUCTIONS
Goals:  1. I will get the lab work done that is mailed to my mailing address before next office visit.  You will need to fast 10-12 hours for this (no food or drink besides water).  2  I will aim for at least 64 oz of water each day (= 8 cups per day or four bottled orozco)  3.  I will use my food journal to record meal times, serving sizes and bring back to next dietitian visit.  4   I will increase my physical activity by starting chair exercise given to you 15-20 minutes every day  5.  Initial weight loss goal encouraged of -5% is recommended over 6 month period.  This is a minimum of: 24 lbs from your weight when initially met with physician of: 479 lbs.

## 2025-08-20 ENCOUNTER — OFFICE VISIT (OUTPATIENT)
Dept: BARIATRICS/WEIGHT MGMT | Age: 50
End: 2025-08-20

## 2025-08-20 VITALS — WEIGHT: 315 LBS | HEIGHT: 75 IN | BODY MASS INDEX: 39.17 KG/M2

## 2025-08-20 DIAGNOSIS — E66.01 MORBID OBESITY WITH BMI OF 50.0-59.9, ADULT (HCC): Primary | ICD-10-CM

## 2025-08-25 ENCOUNTER — OFFICE VISIT (OUTPATIENT)
Dept: PSYCHOLOGY | Age: 50
End: 2025-08-25
Payer: MEDICAID

## 2025-08-25 DIAGNOSIS — F33.41 RECURRENT MAJOR DEPRESSIVE DISORDER, IN PARTIAL REMISSION: ICD-10-CM

## 2025-08-25 DIAGNOSIS — E66.01 MORBID OBESITY (HCC): ICD-10-CM

## 2025-08-25 DIAGNOSIS — F43.10 PTSD (POST-TRAUMATIC STRESS DISORDER): Primary | ICD-10-CM

## 2025-08-25 PROCEDURE — 96130 PSYCL TST EVAL PHYS/QHP 1ST: CPT | Performed by: PSYCHOLOGIST

## 2025-08-25 PROCEDURE — 90791 PSYCH DIAGNOSTIC EVALUATION: CPT | Performed by: PSYCHOLOGIST

## 2025-08-25 PROCEDURE — 96131 PSYCL TST EVAL PHYS/QHP EA: CPT | Performed by: PSYCHOLOGIST

## 2025-08-25 ASSESSMENT — ANXIETY QUESTIONNAIRES
6. BECOMING EASILY ANNOYED OR IRRITABLE: SEVERAL DAYS
1. FEELING NERVOUS, ANXIOUS, OR ON EDGE: SEVERAL DAYS
7. FEELING AFRAID AS IF SOMETHING AWFUL MIGHT HAPPEN: SEVERAL DAYS
4. TROUBLE RELAXING: MORE THAN HALF THE DAYS
IF YOU CHECKED OFF ANY PROBLEMS ON THIS QUESTIONNAIRE, HOW DIFFICULT HAVE THESE PROBLEMS MADE IT FOR YOU TO DO YOUR WORK, TAKE CARE OF THINGS AT HOME, OR GET ALONG WITH OTHER PEOPLE: SOMEWHAT DIFFICULT
7. FEELING AFRAID AS IF SOMETHING AWFUL MIGHT HAPPEN: SEVERAL DAYS
4. TROUBLE RELAXING: MORE THAN HALF THE DAYS
3. WORRYING TOO MUCH ABOUT DIFFERENT THINGS: SEVERAL DAYS
1. FEELING NERVOUS, ANXIOUS, OR ON EDGE: SEVERAL DAYS
IF YOU CHECKED OFF ANY PROBLEMS ON THIS QUESTIONNAIRE, HOW DIFFICULT HAVE THESE PROBLEMS MADE IT FOR YOU TO DO YOUR WORK, TAKE CARE OF THINGS AT HOME, OR GET ALONG WITH OTHER PEOPLE: SOMEWHAT DIFFICULT
3. WORRYING TOO MUCH ABOUT DIFFERENT THINGS: SEVERAL DAYS
5. BEING SO RESTLESS THAT IT IS HARD TO SIT STILL: NOT AT ALL
2. NOT BEING ABLE TO STOP OR CONTROL WORRYING: SEVERAL DAYS
GAD7 TOTAL SCORE: 7
5. BEING SO RESTLESS THAT IT IS HARD TO SIT STILL: NOT AT ALL
2. NOT BEING ABLE TO STOP OR CONTROL WORRYING: SEVERAL DAYS
6. BECOMING EASILY ANNOYED OR IRRITABLE: SEVERAL DAYS

## 2025-08-25 ASSESSMENT — PATIENT HEALTH QUESTIONNAIRE - PHQ9
9. THOUGHTS THAT YOU WOULD BE BETTER OFF DEAD, OR OF HURTING YOURSELF: NOT AT ALL
SUM OF ALL RESPONSES TO PHQ QUESTIONS 1-9: 10
7. TROUBLE CONCENTRATING ON THINGS, SUCH AS READING THE NEWSPAPER OR WATCHING TELEVISION: MORE THAN HALF THE DAYS
SUM OF ALL RESPONSES TO PHQ QUESTIONS 1-9: 10
SUM OF ALL RESPONSES TO PHQ QUESTIONS 1-9: 10
9. THOUGHTS THAT YOU WOULD BE BETTER OFF DEAD, OR OF HURTING YOURSELF: NOT AT ALL
3. TROUBLE FALLING OR STAYING ASLEEP: SEVERAL DAYS
8. MOVING OR SPEAKING SO SLOWLY THAT OTHER PEOPLE COULD HAVE NOTICED. OR THE OPPOSITE - BEING SO FIDGETY OR RESTLESS THAT YOU HAVE BEEN MOVING AROUND A LOT MORE THAN USUAL: SEVERAL DAYS
5. POOR APPETITE OR OVEREATING: SEVERAL DAYS
4. FEELING TIRED OR HAVING LITTLE ENERGY: MORE THAN HALF THE DAYS
6. FEELING BAD ABOUT YOURSELF - OR THAT YOU ARE A FAILURE OR HAVE LET YOURSELF OR YOUR FAMILY DOWN: SEVERAL DAYS
4. FEELING TIRED OR HAVING LITTLE ENERGY: MORE THAN HALF THE DAYS
5. POOR APPETITE OR OVEREATING: SEVERAL DAYS
1. LITTLE INTEREST OR PLEASURE IN DOING THINGS: SEVERAL DAYS
2. FEELING DOWN, DEPRESSED OR HOPELESS: SEVERAL DAYS
10. IF YOU CHECKED OFF ANY PROBLEMS, HOW DIFFICULT HAVE THESE PROBLEMS MADE IT FOR YOU TO DO YOUR WORK, TAKE CARE OF THINGS AT HOME, OR GET ALONG WITH OTHER PEOPLE: SOMEWHAT DIFFICULT
3. TROUBLE FALLING OR STAYING ASLEEP: SEVERAL DAYS
7. TROUBLE CONCENTRATING ON THINGS, SUCH AS READING THE NEWSPAPER OR WATCHING TELEVISION: MORE THAN HALF THE DAYS
1. LITTLE INTEREST OR PLEASURE IN DOING THINGS: SEVERAL DAYS
10. IF YOU CHECKED OFF ANY PROBLEMS, HOW DIFFICULT HAVE THESE PROBLEMS MADE IT FOR YOU TO DO YOUR WORK, TAKE CARE OF THINGS AT HOME, OR GET ALONG WITH OTHER PEOPLE: SOMEWHAT DIFFICULT
SUM OF ALL RESPONSES TO PHQ QUESTIONS 1-9: 10
SUM OF ALL RESPONSES TO PHQ QUESTIONS 1-9: 10
6. FEELING BAD ABOUT YOURSELF - OR THAT YOU ARE A FAILURE OR HAVE LET YOURSELF OR YOUR FAMILY DOWN: SEVERAL DAYS
8. MOVING OR SPEAKING SO SLOWLY THAT OTHER PEOPLE COULD HAVE NOTICED. OR THE OPPOSITE, BEING SO FIGETY OR RESTLESS THAT YOU HAVE BEEN MOVING AROUND A LOT MORE THAN USUAL: SEVERAL DAYS
2. FEELING DOWN, DEPRESSED OR HOPELESS: SEVERAL DAYS

## 2025-09-05 ENCOUNTER — TELEPHONE (OUTPATIENT)
Dept: BARIATRICS/WEIGHT MGMT | Age: 50
End: 2025-09-05

## 2025-09-06 ENCOUNTER — HOSPITAL ENCOUNTER (OUTPATIENT)
Dept: OTHER | Age: 50
Discharge: HOME | End: 2025-09-06
Payer: MEDICAID

## 2025-09-06 DIAGNOSIS — E66.01 MORBID OBESITY WITH BMI OF 50.0-59.9, ADULT (HCC): ICD-10-CM

## 2025-09-06 DIAGNOSIS — Z13.21 SCREENING FOR MALNUTRITION: ICD-10-CM

## 2025-09-06 DIAGNOSIS — M10.9 ACUTE GOUT, UNSPECIFIED CAUSE, UNSPECIFIED SITE: ICD-10-CM

## 2025-09-06 DIAGNOSIS — Z01.818 PRE-OP TESTING: ICD-10-CM

## 2025-09-06 LAB
25(OH)D3 SERPL-MCNC: 31 NG/ML (ref 30–100)
ALBUMIN SERPL BCG-MCNC: 3.9 G/DL (ref 3.4–4.9)
ALP SERPL-CCNC: 73 U/L (ref 40–129)
ALT SERPL W/O P-5'-P-CCNC: 50 U/L (ref 10–50)
AMPHETAMINES UR QL SCN: NEGATIVE
ANION GAP SERPL CALC-SCNC: 11 MEQ/L (ref 8–16)
APTT PPP: 28.1 SECONDS (ref 22–38)
AST SERPL-CCNC: 33 U/L (ref 10–50)
BARBITURATES UR QL SCN: NEGATIVE
BASOPHILS ABSOLUTE: 0 THOU/MM3 (ref 0–0.1)
BASOPHILS NFR BLD AUTO: 0.7 %
BENZODIAZ UR QL SCN: NEGATIVE
BILIRUB SERPL-MCNC: 0.5 MG/DL (ref 0.3–1.2)
BUN SERPL-MCNC: 9 MG/DL (ref 8–23)
BUPRENORPHINE URINE: NEGATIVE
BZE UR QL SCN: NEGATIVE
CALCIUM SERPL-MCNC: 8.9 MG/DL (ref 8.5–10.5)
CANNABINOIDS UR QL SCN: NEGATIVE
CHLORIDE SERPL-SCNC: 105 MEQ/L (ref 98–111)
CHOLEST SERPL-MCNC: 172 MG/DL (ref 100–199)
CO2 SERPL-SCNC: 23 MEQ/L (ref 22–29)
CREAT SERPL-MCNC: 0.7 MG/DL (ref 0.7–1.2)
DEPRECATED MEAN GLUCOSE BLD GHB EST-ACNC: 162 MG/DL (ref 70–126)
DEPRECATED RDW RBC AUTO: 40.7 FL (ref 35–45)
EOSINOPHIL NFR BLD AUTO: 2.9 %
EOSINOPHILS ABSOLUTE: 0.2 THOU/MM3 (ref 0–0.4)
ERYTHROCYTE [DISTWIDTH] IN BLOOD BY AUTOMATED COUNT: 12.4 % (ref 11.5–14.5)
FENTANYL: NEGATIVE
FERRITIN SERPL IA-MCNC: 318 NG/ML (ref 30–400)
FOLATE SERPL-MCNC: > 20 NG/ML (ref 4.6–34.8)
GFR SERPL CREATININE-BSD FRML MDRD: > 90 ML/MIN/1.73M2
GLUCOSE SERPL-MCNC: 168 MG/DL (ref 74–109)
HBA1C MFR BLD HPLC: 7.4 % (ref 4–6)
HCT VFR BLD AUTO: 47.4 % (ref 42–52)
HDLC SERPL-MCNC: 40 MG/DL
HGB BLD-MCNC: 16.3 GM/DL (ref 14–18)
IMM GRANULOCYTES # BLD AUTO: 0.03 THOU/MM3 (ref 0–0.07)
IMM GRANULOCYTES NFR BLD AUTO: 0.5 %
INR PPP: 0.9 (ref 0.85–1.13)
IRON SATN MFR SERPL: 42 % (ref 20–50)
IRON SERPL-MCNC: 117 UG/DL (ref 61–157)
LDLC SERPL CALC-MCNC: 104 MG/DL
LYMPHOCYTES ABSOLUTE: 1.7 THOU/MM3 (ref 1–4.8)
LYMPHOCYTES NFR BLD AUTO: 28.8 %
MCH RBC QN AUTO: 31.2 PG (ref 26–33)
MCHC RBC AUTO-ENTMCNC: 34.4 GM/DL (ref 32.2–35.5)
MCV RBC AUTO: 90.8 FL (ref 80–94)
MONOCYTES ABSOLUTE: 0.4 THOU/MM3 (ref 0.4–1.3)
MONOCYTES NFR BLD AUTO: 7.2 %
NEUTROPHILS ABSOLUTE: 3.5 THOU/MM3 (ref 1.8–7.7)
NEUTROPHILS NFR BLD AUTO: 59.9 %
NRBC BLD AUTO-RTO: 0 /100 WBC
OPIATES UR QL SCN: NEGATIVE
OXYCODONE: NEGATIVE
PCP UR QL SCN: NEGATIVE
PLATELET # BLD AUTO: 148 THOU/MM3 (ref 130–400)
PMV BLD AUTO: 9.5 FL (ref 9.4–12.4)
POTASSIUM SERPL-SCNC: 4.3 MEQ/L (ref 3.5–5.2)
PREALB SERPL-MCNC: 24.3 MG/DL (ref 20–40)
PROT SERPL-MCNC: 6.8 G/DL (ref 6.4–8.3)
PROTHROMBIN TIME: 10.5 SECONDS (ref 10–13.5)
PSA SERPL-MCNC: 0.58 NG/ML (ref 0–1)
PTH-INTACT SERPL-MCNC: 51 PG/ML (ref 15–65)
RBC # BLD AUTO: 5.22 MILL/MM3 (ref 4.7–6.1)
SODIUM SERPL-SCNC: 139 MEQ/L (ref 135–145)
TIBC SERPL-MCNC: 281 UG/DL (ref 171–450)
TRIGL SERPL-MCNC: 140 MG/DL (ref 0–199)
TSH SERPL DL<=0.05 MIU/L-ACNC: 1.69 UIU/ML (ref 0.27–4.2)
URATE SERPL-MCNC: 5.5 MG/DL (ref 3.7–7)
VIT B12 SERPL-MCNC: 1050 PG/ML (ref 232–1245)
WBC # BLD AUTO: 5.9 THOU/MM3 (ref 4.8–10.8)

## 2025-09-06 PROCEDURE — 84550 ASSAY OF BLOOD/URIC ACID: CPT

## 2025-09-06 PROCEDURE — 80053 COMPREHEN METABOLIC PANEL: CPT

## 2025-09-06 PROCEDURE — 82746 ASSAY OF FOLIC ACID SERUM: CPT

## 2025-09-06 PROCEDURE — 82728 ASSAY OF FERRITIN: CPT

## 2025-09-06 PROCEDURE — G0103 PSA SCREENING: HCPCS

## 2025-09-06 PROCEDURE — G0480 DRUG TEST DEF 1-7 CLASSES: HCPCS

## 2025-09-06 PROCEDURE — 84425 ASSAY OF VITAMIN B-1: CPT

## 2025-09-06 PROCEDURE — 83970 ASSAY OF PARATHORMONE: CPT

## 2025-09-06 PROCEDURE — 83550 IRON BINDING TEST: CPT

## 2025-09-06 PROCEDURE — 84443 ASSAY THYROID STIM HORMONE: CPT

## 2025-09-06 PROCEDURE — 82306 VITAMIN D 25 HYDROXY: CPT

## 2025-09-06 PROCEDURE — 84134 ASSAY OF PREALBUMIN: CPT

## 2025-09-06 PROCEDURE — 82607 VITAMIN B-12: CPT

## 2025-09-06 PROCEDURE — 85610 PROTHROMBIN TIME: CPT

## 2025-09-06 PROCEDURE — 85730 THROMBOPLASTIN TIME PARTIAL: CPT

## 2025-09-06 PROCEDURE — 80307 DRUG TEST PRSMV CHEM ANLYZR: CPT

## 2025-09-06 PROCEDURE — 84590 ASSAY OF VITAMIN A: CPT

## 2025-09-06 PROCEDURE — 85025 COMPLETE CBC W/AUTO DIFF WBC: CPT

## 2025-09-06 PROCEDURE — 36415 COLL VENOUS BLD VENIPUNCTURE: CPT

## 2025-09-06 PROCEDURE — 83036 HEMOGLOBIN GLYCOSYLATED A1C: CPT

## 2025-09-06 PROCEDURE — 83540 ASSAY OF IRON: CPT

## 2025-09-06 PROCEDURE — 80061 LIPID PANEL: CPT
